# Patient Record
Sex: FEMALE | Race: WHITE | NOT HISPANIC OR LATINO | Employment: FULL TIME | URBAN - METROPOLITAN AREA
[De-identification: names, ages, dates, MRNs, and addresses within clinical notes are randomized per-mention and may not be internally consistent; named-entity substitution may affect disease eponyms.]

---

## 2017-01-27 ENCOUNTER — OFFICE VISIT (OUTPATIENT)
Dept: FAMILY MEDICINE | Facility: CLINIC | Age: 37
End: 2017-01-27
Payer: COMMERCIAL

## 2017-01-27 VITALS
BODY MASS INDEX: 19.3 KG/M2 | WEIGHT: 123 LBS | HEART RATE: 70 BPM | TEMPERATURE: 97.7 F | DIASTOLIC BLOOD PRESSURE: 78 MMHG | OXYGEN SATURATION: 99 % | HEIGHT: 67 IN | RESPIRATION RATE: 14 BRPM | SYSTOLIC BLOOD PRESSURE: 116 MMHG

## 2017-01-27 DIAGNOSIS — Z00.00 ENCOUNTER FOR ROUTINE ADULT HEALTH EXAMINATION WITHOUT ABNORMAL FINDINGS: Primary | ICD-10-CM

## 2017-01-27 LAB
CHOLEST SERPL-MCNC: 153 MG/DL
GLUCOSE SERPL-MCNC: 81 MG/DL (ref 70–99)
HDLC SERPL-MCNC: 75 MG/DL
HIV 1+2 AB+HIV1 P24 AG SERPL QL IA: NORMAL
LDLC SERPL CALC-MCNC: 68 MG/DL
NONHDLC SERPL-MCNC: 78 MG/DL
T PALLIDUM IGG+IGM SER QL: NEGATIVE
TRIGL SERPL-MCNC: 50 MG/DL
TSH SERPL DL<=0.005 MIU/L-ACNC: 1.94 MU/L (ref 0.4–4)

## 2017-01-27 PROCEDURE — 99395 PREV VISIT EST AGE 18-39: CPT | Performed by: FAMILY MEDICINE

## 2017-01-27 PROCEDURE — 87491 CHLMYD TRACH DNA AMP PROBE: CPT | Performed by: FAMILY MEDICINE

## 2017-01-27 PROCEDURE — 86780 TREPONEMA PALLIDUM: CPT | Performed by: FAMILY MEDICINE

## 2017-01-27 PROCEDURE — 87389 HIV-1 AG W/HIV-1&-2 AB AG IA: CPT | Performed by: FAMILY MEDICINE

## 2017-01-27 PROCEDURE — 87591 N.GONORRHOEAE DNA AMP PROB: CPT | Performed by: FAMILY MEDICINE

## 2017-01-27 PROCEDURE — 82947 ASSAY GLUCOSE BLOOD QUANT: CPT | Performed by: FAMILY MEDICINE

## 2017-01-27 PROCEDURE — 36415 COLL VENOUS BLD VENIPUNCTURE: CPT | Performed by: FAMILY MEDICINE

## 2017-01-27 PROCEDURE — 84443 ASSAY THYROID STIM HORMONE: CPT | Performed by: FAMILY MEDICINE

## 2017-01-27 PROCEDURE — 80061 LIPID PANEL: CPT | Performed by: FAMILY MEDICINE

## 2017-01-27 NOTE — PROGRESS NOTES
SUBJECTIVE:     CC: Tracy Dempsey is an 36 year old woman who presents for preventive health visit.     Healthy Habits:    Do you get at least three servings of calcium containing foods daily (dairy, green leafy vegetables, etc.)? yes    Amount of exercise or daily activities, outside of work: 1 day(s) per week-very active job    Problems taking medications regularly No    Medication side effects: No    Have you had an eye exam in the past two years? yes    Do you see a dentist twice per year? No-Appt in March    Do you have sleep apnea, excessive snoring or daytime drowsiness?no    No concerns today. Has been generally healthy, recently had vomiting and diarrhea after visiting with her sick nieces/nephes. Stopped taking birth control-forgot her nuva ring on vacation and decided to stop as she was experiencing hair loss. Occasionally uses kenalog cream for dry skin irritated skin near her nose. No changes in family history. Has a maternal grandmother who had breast cancer. Doesn't smoke. Drinks 2 glasses of wine a night on average.     -------------------------------------    Today's PHQ-2 Score:   PHQ-2 ( 1999 Pfizer) 5/22/2015 11/15/2013   Q1: Little interest or pleasure in doing things 0 0   Q2: Feeling down, depressed or hopeless 0 0   PHQ-2 Score 0 0     Abuse: Current or Past(Physical, Sexual or Emotional)- No  Do you feel safe in your environment - Yes    Social History   Substance Use Topics     Smoking status: Never Smoker      Smokeless tobacco: Never Used     Alcohol Use: Yes      Comment: socially     The patient does not drink >3 drinks per day nor >7 drinks per week.    Recent Labs   Lab Test  05/22/15   0859  06/07/11   0856   CHOL  171  168   HDL  80  60   LDL  77  94   TRIG  68  71   CHOLHDLRATIO  2.1  2.8       Reviewed orders with patient.  Reviewed health maintenance and updated orders accordingly - Yes    Mammo Decision Support:  Mammogram not appropriate for this patient based on  age.    Pertinent mammograms are reviewed under the imaging tab.  History of abnormal Pap smear: NO - age 30- 65 PAP every 3 years recommended  All Histories reviewed and updated in Epic.  No past medical history on file.   No past surgical history on file.  Obstetric History     No data available          ROS:  C: NEGATIVE for fever, chills, change in weight  I: NEGATIVE for worrisome rashes, moles or lesions  E: NEGATIVE for vision changes or irritation  ENT: NEGATIVE for ear, mouth and throat problems  R: NEGATIVE for significant cough or SOB  B: NEGATIVE for masses, tenderness or discharge  CV: NEGATIVE for chest pain, palpitations or peripheral edema  GI: NEGATIVE for nausea, abdominal pain, heartburn, or change in bowel habits  : NEGATIVE for unusual urinary or vaginal symptoms. Periods are regular.  M: NEGATIVE for significant arthralgias or myalgia  N: NEGATIVE for weakness, dizziness or paresthesias  P: NEGATIVE for changes in mood or affect    Problem list, Medication list, Allergies, and Medical/Social/Surgical histories reviewed in [facility] and updated as appropriate.  Labs reviewed in EPIC  OBJECTIVE:     There were no vitals taken for this visit.  EXAM:  GENERAL: healthy, alert and no distress  EYES: Eyes grossly normal to inspection, PERRL and conjunctivae and sclerae normal  HENT: ear canals and TM's normal, nose and mouth without ulcers or lesions  NECK: no adenopathy, no asymmetry, masses, or scars and thyroid normal to palpation  RESP: lungs clear to auscultation - no rales, rhonchi or wheezes  BREAST: normal without masses, tenderness or nipple discharge and no palpable axillary masses or adenopathy  CV: regular rate and rhythm, normal S1 S2, no S3 or S4, no murmur, click or rub, no peripheral edema and peripheral pulses strong  ABDOMEN: soft, nontender, no hepatosplenomegaly, no masses and bowel sounds normal  MS: no gross musculoskeletal defects noted, no edema  SKIN: no suspicious lesions or  "rashes  NEURO: Normal strength and tone, mentation intact and speech normal  PSYCH: mentation appears normal, affect normal/bright    ASSESSMENT/PLAN:     Tracy was seen today for physical.    Diagnoses and all orders for this visit:    Encounter for routine adult health examination without abnormal findings  -     HIV Antigen Antibody Combo  -     Anti Treponema  -     NEISSERIA GONORRHOEA PCR  -     CHLAMYDIA TRACHOMATIS PCR  -     Lipid Profile (Chol, Trig, HDL, LDL calc)  -     Glucose  -     TSH with free T4 reflex    Tracy would like STD screening as she hasn't has screening in two years. No symptoms of STD's.   - HIV antigen antibody combo   - anti treponema   - chlamydia trachomatis PCR  - neisseria gonorrhea PCR    Has not had lipid levels, TSH, or glucose levels checked in sometime. Has a sister with hashimoto's. No family history of hyperlipidemia or diabetes. Routine check   - lipid profile  - glucose  - TSH with free T4 reflex    COUNSELING:   Reviewed preventive health counseling, as reflected in patient instructions       Regular exercise       Healthy diet/nutrition       Safe sex practices/STD prevention         reports that she has never smoked. She has never used smokeless tobacco.    Estimated body mass index is 19.10 kg/(m^2) as calculated from the following:    Height as of 5/22/15: 5' 7\" (1.702 m).    Weight as of 5/22/15: 122 lb (55.339 kg).       Counseling Resources:  ATP IV Guidelines  Pooled Cohorts Equation Calculator  Breast Cancer Risk Calculator  FRAX Risk Assessment  ICSI Preventive Guidelines  Dietary Guidelines for Americans, 2010  USDA's MyPlate  ASA Prophylaxis  Lung CA Screening      This note is scribed by Kiki Oneil MS3 on behalf of Dr. Marina.     Jacque Marina MD  [facility]  HPI      ROS      Physical Exam      "

## 2017-01-27 NOTE — NURSING NOTE
"Chief Complaint   Patient presents with     Physical     pap not due til 2018-pt is fasting       Initial /78 mmHg  Pulse 70  Temp(Src) 97.7  F (36.5  C) (Oral)  Resp 14  Ht 5' 6.75\" (1.695 m)  Wt 123 lb (55.792 kg)  BMI 19.42 kg/m2  SpO2 99%  LMP 01/09/2017  Breastfeeding? No Estimated body mass index is 19.42 kg/(m^2) as calculated from the following:    Height as of this encounter: 5' 6.75\" (1.695 m).    Weight as of this encounter: 123 lb (55.792 kg).  BP completed using cuff size: regular    Health Maintenance that is potentially due pending provider review:  Health Maintenance Due   Topic Date Due     INFLUENZA VACCINE (SYSTEM ASSIGNED)  09/01/2016         Fear of needles-can get this done at work per pt    "

## 2017-01-29 LAB
C TRACH DNA SPEC QL NAA+PROBE: NORMAL
N GONORRHOEA DNA SPEC QL NAA+PROBE: NORMAL
SPECIMEN SOURCE: NORMAL
SPECIMEN SOURCE: NORMAL

## 2017-04-12 ENCOUNTER — TELEPHONE (OUTPATIENT)
Dept: FAMILY MEDICINE | Facility: CLINIC | Age: 37
End: 2017-04-12

## 2017-04-12 NOTE — TELEPHONE ENCOUNTER
LS,  Pt was told yesterday by previous partner that he was diagnosed with HSV  Pt not sure what type he has; oral or genital  Pt has never had symptoms  Wondering if she should be tested.  Please advise.  Tammy ARROYO RN

## 2017-04-12 NOTE — TELEPHONE ENCOUNTER
i would not recommend testing unless she has symptoms, pain , vaginal discomfort, outbreaks etc   We do not treat unless there is an outbreak, can you let her know that ?  Thanks

## 2017-04-12 NOTE — TELEPHONE ENCOUNTER
Reason for Call:  call back    Detailed comments: patient would like someone to call her back to discuss STD results.    Phone Number Patient can be reached at: Home number on file [phone] (home)    Best Time: any time    Can we leave a detailed message on this number? YES    Call taken on 4/12/2017 at 1:07 PM by Shalini Butler  .

## 2017-04-13 ENCOUNTER — TELEPHONE (OUTPATIENT)
Dept: FAMILY MEDICINE | Facility: CLINIC | Age: 37
End: 2017-04-13

## 2017-04-13 DIAGNOSIS — Z11.3 SCREEN FOR STD (SEXUALLY TRANSMITTED DISEASE): Primary | ICD-10-CM

## 2017-04-13 NOTE — TELEPHONE ENCOUNTER
Reason for Call:  More Questions on Test Results     Detailed comments: Tracy Dempsey called and spoke with a nurse about her STD results yesterday. She was told that she would not need further testing and she would like to speak with a nurse about clarifications and some other questions that have come up.       Phone Number Patient can be reached at: Home number on file [phone] (home)    Best Time: ASAP     Can we leave a detailed message on this number? YES    Call taken on 4/13/2017 at 11:10 AM by Gerda Mccollum

## 2017-04-13 NOTE — TELEPHONE ENCOUNTER
LS,  See TE from yesterday.  Pt has further herpes concerns.    Patient would like herpes testing anyway for piece of mind.  Is worried she can transmit this to someone else.  She has never had herpes symptoms though.  Educated pt on transmission of herpes, using protection, symptoms to watch for, etc.    States she has another condition where she on occasion has dry patch of skin to genital area and some discomfort at times also, but this has been present since before she started having sexual activity.  Please advise on lab orders for herpes  Tammy ARROYO RN

## 2017-04-19 DIAGNOSIS — Z11.3 SCREEN FOR STD (SEXUALLY TRANSMITTED DISEASE): ICD-10-CM

## 2017-04-28 ENCOUNTER — OFFICE VISIT (OUTPATIENT)
Dept: FAMILY MEDICINE | Facility: CLINIC | Age: 37
End: 2017-04-28
Payer: COMMERCIAL

## 2017-04-28 VITALS
TEMPERATURE: 96 F | RESPIRATION RATE: 14 BRPM | BODY MASS INDEX: 19.46 KG/M2 | DIASTOLIC BLOOD PRESSURE: 78 MMHG | HEIGHT: 67 IN | HEART RATE: 76 BPM | SYSTOLIC BLOOD PRESSURE: 112 MMHG | WEIGHT: 124 LBS | OXYGEN SATURATION: 99 %

## 2017-04-28 DIAGNOSIS — Z11.3 SCREEN FOR STD (SEXUALLY TRANSMITTED DISEASE): Primary | ICD-10-CM

## 2017-04-28 PROCEDURE — 86696 HERPES SIMPLEX TYPE 2 TEST: CPT | Performed by: FAMILY MEDICINE

## 2017-04-28 PROCEDURE — 86695 HERPES SIMPLEX TYPE 1 TEST: CPT | Performed by: FAMILY MEDICINE

## 2017-04-28 PROCEDURE — 36415 COLL VENOUS BLD VENIPUNCTURE: CPT | Performed by: FAMILY MEDICINE

## 2017-04-28 PROCEDURE — 99213 OFFICE O/P EST LOW 20 MIN: CPT | Performed by: FAMILY MEDICINE

## 2017-04-28 NOTE — MR AVS SNAPSHOT
"              After Visit Summary   4/28/2017    Tracy Dempsey    MRN: [id number]           Patient Information     Date Of Birth          1980        Visit Information        Provider Department      4/28/2017 2:00 PM Jacque Marina MD [facility]        Today's Diagnoses     Screen for STD (sexually transmitted disease)    -  1       Follow-ups after your visit        Who to contact     If you have questions or need follow up information about today's clinic visit or your schedule please contact [facility] directly at [phone].  Normal or non-critical lab and imaging results will be communicated to you by "[employer]hart, letter or phone within 4 business days after the clinic has received the results. If you do not hear from us within 7 days, please contact the clinic through "[employer]hart or phone. If you have a critical or abnormal lab result, we will notify you by phone as soon as possible.  Submit refill requests through [employer] or call your pharmacy and they will forward the refill request to us. Please allow 3 business days for your refill to be completed.          Additional Information About Your Visit        MyChart Information     [employer] gives you secure access to your electronic health record. If you see a primary care provider, you can also send messages to your care team and make appointments. If you have questions, please call your primary care clinic.  If you do not have a primary care provider, please call [phone] and they will assist you.        Care EveryWhere ID     This is your Care EveryWhere ID. This could be used by other organizations to access your [facility] medical records  [id number]        Your Vitals Were     Pulse Temperature Respirations Height Pulse Oximetry Breastfeeding?    76 96  F (35.6  C) (Oral) 14 5' 6.75\" (1.695 m) 99% No    BMI (Body Mass Index)                   19.57 kg/m2            Blood Pressure from Last 3 Encounters:   04/28/17 112/78 "   01/27/17 116/78   05/22/15 109/64    Weight from Last 3 Encounters:   04/28/17 124 lb (56.2 kg)   01/27/17 123 lb (55.8 kg)   05/22/15 122 lb (55.3 kg)              We Performed the Following     Herpes Simplex Virus 1 and 2 IgG        Primary Care Provider Office Phone # Fax #    Jacque Marina [phone]       [facility] [address] [address]        Thank you!     Thank you for choosing [facility]  for your care. Our goal is always to provide you with excellent care. Hearing back from our patients is one way we can continue to improve our services. Please take a few minutes to complete the written survey that you may receive in the mail after your visit with us. Thank you!             Your Updated Medication List - Protect others around you: Learn how to safely use, store and throw away your medicines at www.disposemymeds.org.          This list is accurate as of: 4/28/17  3:08 PM.  Always use your most recent med list.                   Brand Name Dispense Instructions for use    triamcinolone 0.1 % cream    KENALOG    60 g    Apply 60 g topically 2 times daily.

## 2017-04-28 NOTE — LETTER
[facility]  [address], [address]  [place], [place] [address] [phone]     May 2, 2017     Tracy Dempsey                                                                                                                               [address] [facility] [address]        Dear Tracy Dempsey:      The results of your recent labs were NORMAL.  If you have any further questions or problems, please contact our office.    Sincerely,      Jacque Marina MD    Clinic hours:  Monday 7:30 AM - 5:00 PM    Tuesday  7:00 AM - 7:00 PM    Wednesday  7:00 AM - 5:00 PM    Thursday  7:30 AM -  7:00 PM    Friday   7:30 AM -  5:00 PM

## 2017-04-28 NOTE — PROGRESS NOTES
SUBJECTIVE:                                                    Tracy Dempsey is a 36 year old female who presents to clinic today for the following health issues:    Chief Complaint   Patient presents with     RECHECK     follow up labs             Problem list and histories reviewed & adjusted, as indicated.  Additional history: as documented    Patient Active Problem List   Diagnosis     CARDIOVASCULAR SCREENING; LDL GOAL LESS THAN 160     Cervicalgia     History reviewed. No pertinent surgical history.    Social History   Substance Use Topics     Smoking status: Never Smoker     Smokeless tobacco: Never Used     Alcohol use 0.0 oz/week     0 Standard drinks or equivalent per week      Comment: 2 drinks daily     Family History   Problem Relation Age of Onset     Hypertension Father      Hypertension Paternal Grandfather      CANCER Maternal Grandmother      breast     CEREBROVASCULAR DISEASE Paternal Grandfather      Alcohol/Drug Father      Neurologic Disorder Paternal Grandmother      als     Neurologic Disorder Sister      MS may be     Thyroid Disease Sister          Current Outpatient Prescriptions   Medication Sig Dispense Refill     triamcinolone (KENALOG) 0.1 % cream Apply 60 g topically 2 times daily. 60 g 0     Allergies   Allergen Reactions     Nickel      Recent Labs   Lab Test  01/27/17   0916  05/22/15   0859  06/07/11   0856   LDL  68  77  94   HDL  75  80  60   TRIG  50  68  71   TSH  1.94   --   1.91      BP Readings from Last 3 Encounters:   04/28/17 112/78   01/27/17 116/78   05/22/15 109/64    Wt Readings from Last 3 Encounters:   04/28/17 124 lb (56.2 kg)   01/27/17 123 lb (55.8 kg)   05/22/15 122 lb (55.3 kg)                  Labs reviewed in EPIC    Reviewed and updated as needed this visit by clinical staff  Tobacco  Allergies  Meds  Med Hx  Surg Hx  Fam Hx  Soc Hx      Reviewed and updated as needed this visit by Provider         ROS:  Constitutional, HEENT, cardiovascular,  "pulmonary, gi and gu systems are negative, except as otherwise noted.    OBJECTIVE:                                                    /78  Pulse 76  Temp 96  F (35.6  C) (Oral)  Resp 14  Ht 5' 6.75\" (1.695 m)  Wt 124 lb (56.2 kg)  SpO2 99%  Breastfeeding? No  BMI 19.57 kg/m2  Body mass index is 19.57 kg/(m^2).  GENERAL: healthy, alert and no distress  MS: no gross musculoskeletal defects noted, no edema    Diagnostic Test Results:  none      ASSESSMENT/PLAN:                                                        1. Screen for STD (sexually transmitted disease)  She just wants to make sure she does not have this , her most recent partner was diagnosed with herpes, they have not been together for three months and she does not have any symptoms currently , she has had occasional vag itching but no sores , no pain etc   - Herpes Simplex Virus 1 and 2 IgG    Discussed doing the screening , which will check for both type 1 and type 2 herpes simplex, Pt is aware  and comfortable with the current plan.      Jacque Marina MD  [facility]    "

## 2017-04-28 NOTE — NURSING NOTE
"Chief Complaint   Patient presents with     RECHECK     follow up labs       Initial /78  Pulse 76  Temp 96  F (35.6  C) (Oral)  Resp 14  Ht 5' 6.75\" (1.695 m)  Wt 124 lb (56.2 kg)  SpO2 99%  Breastfeeding? No  BMI 19.57 kg/m2 Estimated body mass index is 19.57 kg/(m^2) as calculated from the following:    Height as of this encounter: 5' 6.75\" (1.695 m).    Weight as of this encounter: 124 lb (56.2 kg).  BP completed using cuff size: regular    Health Maintenance that is potentially due pending provider review:  There are no preventive care reminders to display for this patient.      n/a  "

## 2017-05-01 LAB
HSV1 IGG SERPL QL IA: NORMAL AI (ref 0–0.8)
HSV2 IGG SERPL QL IA: NORMAL AI (ref 0–0.8)

## 2018-01-26 ENCOUNTER — OFFICE VISIT (OUTPATIENT)
Dept: FAMILY MEDICINE | Facility: CLINIC | Age: 38
End: 2018-01-26
Payer: COMMERCIAL

## 2018-01-26 VITALS
WEIGHT: 127 LBS | SYSTOLIC BLOOD PRESSURE: 118 MMHG | TEMPERATURE: 97.9 F | HEART RATE: 78 BPM | RESPIRATION RATE: 14 BRPM | BODY MASS INDEX: 19.93 KG/M2 | HEIGHT: 67 IN | DIASTOLIC BLOOD PRESSURE: 76 MMHG | OXYGEN SATURATION: 99 %

## 2018-01-26 DIAGNOSIS — N76.6 ULCER OF GENITAL LABIA: Primary | ICD-10-CM

## 2018-01-26 PROCEDURE — 99000 SPECIMEN HANDLING OFFICE-LAB: CPT | Performed by: FAMILY MEDICINE

## 2018-01-26 PROCEDURE — 87252 VIRUS INOCULATION TISSUE: CPT | Mod: 90 | Performed by: FAMILY MEDICINE

## 2018-01-26 PROCEDURE — 99213 OFFICE O/P EST LOW 20 MIN: CPT | Performed by: FAMILY MEDICINE

## 2018-01-26 NOTE — PATIENT INSTRUCTIONS
Herpes  If you have herpes, you re not alone. Millions of Americans have it. Herpes has no cure. But you can control it and learn how to protect yourself and others from outbreaks.  What is herpes?  Herpes is a chronic (lifelong) virus. It can cause sores and discomfort. You get it from contact with someone who carries the virus. If sores occur on the lips, you have oral herpes. If sores occur on the penis or around the vagina, you have genital herpes.  Herpes outbreaks    The first outbreak of herpes sores is usually the most severe. Then, the soldiers of the body s immune system, white blood cells, produce antibodies. These antibodies help neutralize the herpes virus and may help make future attacks less severe.    Some people have only one outbreak of sores. Some people have periods of frequent outbreaks (every few weeks). Outbreaks of herpes sores usually happen less often over time.    Herpes sores may appear without a cause. Outbreaks are more likely when the immune system is weak. Other viral infections (such as a cold) can cause outbreaks. Stress from a poor diet, fatigue, or emotional upset can lead to outbreaks of sores. Exposure to strong sunlight often causes herpes sores to reappear.   To help prevent outbreaks    To prevent oral herpes outbreaks, avoid overexposure to wind, sun, and extreme temperatures. Use sunscreen and lip balm on affected areas.    If you are having frequent outbreaks, ask your healthcare provider about medicines that can help prevent outbreaks.  How herpes spreads to others  Herpes can be spread during an outbreak. But even without sores present, you can still  shed  the virus and infect others. You can take steps to prevent this.  To protect yourself and others    If you have an oral sore, avoid kissing and oral-genital contact.    If you have a genital sore, avoid intercourse. Also avoid oral-genital contact.    Wash your hands after touching a sore.    Use a condom each time  you have sex. You can pass the virus even when sores aren t present. If you re unsure about the timing of certain kinds of physical contact, ask your health care provider.    Tell any new partners that you have herpes.    If you re a woman, have Pap tests as often as your healthcare provider recommends.    A woman can spread herpes to their  during the birth process, whether or not they have an active genital sore. If pregnant, don't forget to tell your healthcare provider early in the pregnancy.     In some cases, daily antiviral medicine (acyclovir, famcyclovir, or valavyclovir), in addition to consistent condom use, may reduce your chances of spreading herpes to an uninfected partner. Ask your healthcare provider if this medicine would be helpful for you.  Resources  American Social Health Association STD Hotline  127.249.5722  www.ashastd.org  Centers for Disease Control and Prevention  947.495.3609  www.cdc.gov/std   Date Last Reviewed: 2016-2017 [employer] [address] [place] [address], [place] [address]. All rights reserved. This information is not intended as a substitute for professional medical care. Always follow your healthcare professional's instructions.

## 2018-01-26 NOTE — PROGRESS NOTES
SUBJECTIVE:   Tracy Dempsey is a 37 year old female who presents to clinic today for the following health issues:      Chief Complaint   Patient presents with     Vaginal Problem     possible herpes - lesion present-wants culture-herpes test done for reported exposure 4--results negative-2nd time for same type symptoms- sx present for 1 week, last outbreak lasted 2 weeks, painful and itchy     Patient presents to clinic for evaluation of foot ulcer on the labia majora that has been present for the past 1 week.   Patient was seen in the clinic previously approximately 1 year ago for testing for herpes.  Test at that time were negative.  She is concerned that the current ulcer is consistent with a herpes lesion.  Ulcer is painful.  Has not used any over-the-counter medications for it.     Problem list and histories reviewed & adjusted, as indicated.  Additional history: as documented    Patient Active Problem List   Diagnosis     CARDIOVASCULAR SCREENING; LDL GOAL LESS THAN 160     Cervicalgia     History reviewed. No pertinent surgical history.    Social History   Substance Use Topics     Smoking status: Never Smoker     Smokeless tobacco: Never Used     Alcohol use 0.0 oz/week     0 Standard drinks or equivalent per week      Comment: 2 drinks daily     Family History   Problem Relation Age of Onset     Hypertension Father      Hypertension Paternal Grandfather      CANCER Maternal Grandmother      breast     CEREBROVASCULAR DISEASE Paternal Grandfather      Alcohol/Drug Father      Neurologic Disorder Paternal Grandmother      als     Neurologic Disorder Sister      MS may be     Thyroid Disease Sister            Reviewed and updated as needed this visit by clinical staff  Tobacco  Allergies  Meds  Med Hx  Surg Hx  Fam Hx  Soc Hx      Reviewed and updated as needed this visit by Provider         ROS:  Constitutional, HEENT, cardiovascular, pulmonary, gi and gu systems are negative, except as otherwise  "noted.    OBJECTIVE:     /76  Pulse 78  Temp 97.9  F (36.6  C) (Tympanic)  Resp 14  Ht 5' 6.75\" (1.695 m)  Wt 127 lb (57.6 kg)  SpO2 99%  Breastfeeding? No  BMI 20.04 kg/m2  Body mass index is 20.04 kg/(m^2).  GENERAL: healthy, alert and no distress  RESP: lungs clear to auscultation - no rales, rhonchi or wheezes  CV: regular rate and rhythm, normal S1 S2, no S3 or S4, no murmur, click or rub, no peripheral edema and peripheral pulses strong   (female): normal female external genitalia, normal urethral meatus, vaginal mucosa, normal cervix/adnexa/uterus without masses or discharge.  Superficial clusters of ulcer on the left labia minora.  Patient  Diagnostic Test Results:  Results for orders placed or performed in visit on 01/26/18   Viral Culture Non-respiratory   Result Value Ref Range    Viral Culture Source Labia     Viral Culture Non-respiratory Preliminary SEE NOTE     Viral Culture Non-respiratory Final SEE NOTE (A)        ASSESSMENT/PLAN:   1. Ulcer of genital labia  Assessment: Appearance of the ulcer is consistent with herpes simplex infection.  Sample also sent to the lab for analysis.  Patient was advised to avoid sexual intercourse for coming into contact with liquids from the ulcer and the rest of her body.  Plan:  - Viral Culture Non-respiratory    Toan Shanks MD  [facility]  PAGER: [phone]      "

## 2018-01-26 NOTE — NURSING NOTE
"Chief Complaint   Patient presents with     Vaginal Problem     possible herpes - lesion present-wants culture-herpes test done for reported exposure 4--results negative-2nd time for same type symptoms- sx present for 1 week, last outbreak lasted 2 weeks, painful and itchy       Initial /76  Pulse 78  Temp 97.9  F (36.6  C) (Tympanic)  Resp 14  Ht 5' 6.75\" (1.695 m)  Wt 127 lb (57.6 kg)  SpO2 99%  Breastfeeding? No  BMI 20.04 kg/m2 Estimated body mass index is 20.04 kg/(m^2) as calculated from the following:    Height as of this encounter: 5' 6.75\" (1.695 m).    Weight as of this encounter: 127 lb (57.6 kg).  BP completed using cuff size: regular    Health Maintenance that is potentially due pending provider review:  Health Maintenance Due   Topic Date Due     INFLUENZA VACCINE (SYSTEM ASSIGNED)  09/01/2017     PAP Q3 YR  05/22/2018         Pt advised to make px  Flu vaccine declined  "

## 2018-01-26 NOTE — MR AVS SNAPSHOT
After Visit Summary   1/26/2018    Tracy Dempsey    MRN: [id number]           Patient Information     Date Of Birth          1980        Visit Information        Provider Department      1/26/2018 10:30 AM Toan Shanks MD [facility]        Today's Diagnoses     Ulcer of genital labia    -  1      Care Instructions      Herpes  If you have herpes, you re not alone. Millions of Americans have it. Herpes has no cure. But you can control it and learn how to protect yourself and others from outbreaks.  What is herpes?  Herpes is a chronic (lifelong) virus. It can cause sores and discomfort. You get it from contact with someone who carries the virus. If sores occur on the lips, you have oral herpes. If sores occur on the penis or around the vagina, you have genital herpes.  Herpes outbreaks    The first outbreak of herpes sores is usually the most severe. Then, the soldiers of the body s immune system, white blood cells, produce antibodies. These antibodies help neutralize the herpes virus and may help make future attacks less severe.    Some people have only one outbreak of sores. Some people have periods of frequent outbreaks (every few weeks). Outbreaks of herpes sores usually happen less often over time.    Herpes sores may appear without a cause. Outbreaks are more likely when the immune system is weak. Other viral infections (such as a cold) can cause outbreaks. Stress from a poor diet, fatigue, or emotional upset can lead to outbreaks of sores. Exposure to strong sunlight often causes herpes sores to reappear.   To help prevent outbreaks    To prevent oral herpes outbreaks, avoid overexposure to wind, sun, and extreme temperatures. Use sunscreen and lip balm on affected areas.    If you are having frequent outbreaks, ask your healthcare provider about medicines that can help prevent outbreaks.  How herpes spreads to others  Herpes can be spread during an outbreak. But even  without sores present, you can still  shed  the virus and infect others. You can take steps to prevent this.  To protect yourself and others    If you have an oral sore, avoid kissing and oral-genital contact.    If you have a genital sore, avoid intercourse. Also avoid oral-genital contact.    Wash your hands after touching a sore.    Use a condom each time you have sex. You can pass the virus even when sores aren t present. If you re unsure about the timing of certain kinds of physical contact, ask your health care provider.    Tell any new partners that you have herpes.    If you re a woman, have Pap tests as often as your healthcare provider recommends.    A woman can spread herpes to their  during the birth process, whether or not they have an active genital sore. If pregnant, don't forget to tell your healthcare provider early in the pregnancy.     In some cases, daily antiviral medicine (acyclovir, famcyclovir, or valavyclovir), in addition to consistent condom use, may reduce your chances of spreading herpes to an uninfected partner. Ask your healthcare provider if this medicine would be helpful for you.  Resources  American [employer] Association STD Hotline  692.931.6877  www.ashastd.org  Centers for Disease Control and Prevention  158.907.1635  www.cdc.gov/std   Date Last Reviewed: 2016-2017 [employer]. [address] [place] [address]. All rights reserved. This information is not intended as a substitute for professional medical care. Always follow your healthcare professional's instructions.                Follow-ups after your visit        Follow-up notes from your care team     Return if symptoms worsen or fail to improve.      Who to contact     If you have questions or need follow up information about today's clinic visit or your schedule please contact [facility] directly at [phone].  Normal or non-critical lab and imaging results will  "be communicated to you by MyChart, letter or phone within 4 business days after the clinic has received the results. If you do not hear from us within 7 days, please contact the clinic through [email or web address] or phone. If you have a critical or abnormal lab result, we will notify you by phone as soon as possible.  Submit refill requests through [email or web address] or call your pharmacy and they will forward the refill request to us. Please allow 3 business days for your refill to be completed.          Additional Information About Your Visit        [email or web address] Information     [email or web address] gives you secure access to your electronic health record. If you see a primary care provider, you can also send messages to your care team and make appointments. If you have questions, please call your primary care clinic.  If you do not have a primary care provider, please call [phone] and they will assist you.        Care EveryWhere ID     This is your Care EveryWhere ID. This could be used by other organizations to access your [place] medical records  [id number]        Your Vitals Were     Pulse Temperature Respirations Height Pulse Oximetry Breastfeeding?    78 97.9  F (36.6  C) (Tympanic) 14 5' 6.75\" (1.695 m) 99% No    BMI (Body Mass Index)                   20.04 kg/m2            Blood Pressure from Last 3 Encounters:   01/26/18 118/76   04/28/17 112/78   01/27/17 116/78    Weight from Last 3 Encounters:   01/26/18 127 lb (57.6 kg)   04/28/17 124 lb (56.2 kg)   01/27/17 123 lb (55.8 kg)              Today, you had the following     No orders found for display       Primary Care Provider Office Phone # Fax #    Jacque Marina [phone] [address] [facility] [address] [place] [address]        Equal Access to Services     JUAREZ CHRISTIANSON : Cassidy Bowling, nancy mark, jacinto gardner. So [facility] [phone].    ATENCIÓN: Si habla español, tiene a cedeno disposición " servicios gratuitos de asistencia lingüística. Fabian lopez [phone].    We comply with applicable federal civil rights laws and [place] laws. We do not discriminate on the basis of race, color, national origin, age, disability, sex, sexual orientation, or gender identity.            Thank you!     Thank you for choosing [facility]  for your care. Our goal is always to provide you with excellent care. Hearing back from our patients is one way we can continue to improve our services. Please take a few minutes to complete the written survey that you may receive in the mail after your visit with us. Thank you!             Your Updated Medication List - Protect others around you: Learn how to safely use, store and throw away your medicines at www.disposemymeds.org.          This list is accurate as of 1/26/18 10:55 AM.  Always use your most recent med list.                   Brand Name Dispense Instructions for use Diagnosis    triamcinolone 0.1 % cream    KENALOG    60 g    Apply 60 g topically 2 times daily.    Rash

## 2018-01-28 LAB
SPECIMEN SOURCE: ABNORMAL
VIRUS SPEC CULT: ABNORMAL
VIRUS SPEC CULT: ABNORMAL

## 2018-01-29 DIAGNOSIS — B00.9 HSV (HERPES SIMPLEX VIRUS) INFECTION: Primary | ICD-10-CM

## 2018-01-29 RX ORDER — ACYCLOVIR 400 MG/1
400 TABLET ORAL 3 TIMES DAILY
Qty: 30 TABLET | Refills: 0 | Status: SHIPPED | OUTPATIENT
Start: 2018-01-29 | End: 2018-08-15

## 2018-01-29 RX ORDER — ACYCLOVIR 200 MG/1
200 CAPSULE ORAL
Qty: 25 CAPSULE | Refills: 5 | Status: SHIPPED | OUTPATIENT
Start: 2018-02-12 | End: 2019-08-07

## 2018-01-30 ENCOUNTER — OFFICE VISIT (OUTPATIENT)
Dept: FAMILY MEDICINE | Facility: CLINIC | Age: 38
End: 2018-01-30
Payer: COMMERCIAL

## 2018-01-30 VITALS
BODY MASS INDEX: 19.62 KG/M2 | WEIGHT: 125 LBS | DIASTOLIC BLOOD PRESSURE: 72 MMHG | SYSTOLIC BLOOD PRESSURE: 111 MMHG | TEMPERATURE: 97.7 F | OXYGEN SATURATION: 99 % | HEART RATE: 68 BPM | HEIGHT: 67 IN | RESPIRATION RATE: 16 BRPM

## 2018-01-30 DIAGNOSIS — Z00.00 ENCOUNTER FOR ROUTINE ADULT HEALTH EXAMINATION WITHOUT ABNORMAL FINDINGS: Primary | ICD-10-CM

## 2018-01-30 DIAGNOSIS — B00.9 HSV (HERPES SIMPLEX VIRUS) INFECTION: ICD-10-CM

## 2018-01-30 PROCEDURE — 87624 HPV HI-RISK TYP POOLED RSLT: CPT | Performed by: FAMILY MEDICINE

## 2018-01-30 PROCEDURE — 99395 PREV VISIT EST AGE 18-39: CPT | Performed by: FAMILY MEDICINE

## 2018-01-30 PROCEDURE — G0145 SCR C/V CYTO,THINLAYER,RESCR: HCPCS | Performed by: FAMILY MEDICINE

## 2018-01-30 NOTE — MR AVS SNAPSHOT
After Visit Summary   1/30/2018    Tracy Dempsey    MRN: [id number]           Patient Information     Date Of Birth          1980        Visit Information        Provider Department      1/30/2018 9:00 AM Jacque Marina MD [facility]        Today's Diagnoses     Encounter for routine adult health examination without abnormal findings    -  1    HSV (herpes simplex virus) infection          Care Instructions      Preventive Health Recommendations  Female Ages 26 - 39  Yearly exam:   See your health care provider every year in order to    Review health changes.     Discuss preventive care.      Review your medicines if you your doctor has prescribed any.    Until age 30: Get a Pap test every three years (more often if you have had an abnormal result).    After age 30: Talk to your doctor about whether you should have a Pap test every 3 years or have a Pap test with HPV screening every 5 years.   You do not need a Pap test if your uterus was removed (hysterectomy) and you have not had cancer.  You should be tested each year for STDs (sexually transmitted diseases), if you're at risk.   Talk to your provider about how often to have your cholesterol checked.  If you are at risk for diabetes, you should have a diabetes test (fasting glucose).  Shots: Get a flu shot each year. Get a tetanus shot every 10 years.   Nutrition:     Eat at least 5 servings of fruits and vegetables each day.    Eat whole-grain bread, whole-wheat pasta and brown rice instead of white grains and rice.    Talk to your provider about Calcium and Vitamin D.     Lifestyle    Exercise at least 150 minutes a week (30 minutes a day, 5 days of the week). This will help you control your weight and prevent disease.    Limit alcohol to one drink per day.    No smoking.     Wear sunscreen to prevent skin cancer.    See your dentist every six months for an exam and cleaning.            Follow-ups after your visit        Who to  "contact     If you have questions or need follow up information about today's clinic visit or your schedule please contact [facility] directly at [phone].  Normal or non-critical lab and imaging results will be communicated to you by MyChart, letter or phone within 4 business days after the clinic has received the results. If you do not hear from us within 7 days, please contact the clinic through [employer] or phone. If you have a critical or abnormal lab result, we will notify you by phone as soon as possible.  Submit refill requests through [employer] or call your pharmacy and they will forward the refill request to us. Please allow 3 business days for your refill to be completed.          Additional Information About Your Visit        [facility] Hospital[facility] Information     [employer] gives you secure access to your electronic health record. If you see a primary care provider, you can also send messages to your care team and make appointments. If you have questions, please call your primary care clinic.  If you do not have a primary care provider, please call [phone] and they will assist you.        Care EveryWhere ID     This is your Care EveryWhere ID. This could be used by other organizations to access your [place] medical records  [id number]        Your Vitals Were     Pulse Temperature Respirations Height Last Period Pulse Oximetry    68 97.7  F (36.5  C) (Oral) 16 5' 6.75\" (1.695 m) 01/22/2018 99%    BMI (Body Mass Index)                   19.72 kg/m2            Blood Pressure from Last 3 Encounters:   01/30/18 111/72   01/26/18 118/76   04/28/17 112/78    Weight from Last 3 Encounters:   01/30/18 125 lb (56.7 kg)   01/26/18 127 lb (57.6 kg)   04/28/17 124 lb (56.2 kg)              We Performed the Following     Pap imaged thin layer screen with HPV - recommended age 30 - 65 years (select HPV order below)        Primary Care Provider Office Phone # Fax #    Jacque Marina [phone]       " [address] [facility]   [facility] [address]        Equal Access to Services     JUAREZ MEGHAN : Hadii aad ku hadottobilly Bowling, nancy mark, farhandari hendricksmamagalis rocha, jacinto manueltrinoroberto carlos augustin. So [facility] [phone].    ATENCIÓN: Si habla español, tiene a cedeno disposición servicios gratuitos de asistencia lingüística. Llame al [phone].    We comply with applicable federal civil rights laws and [place] laws. We do not discriminate on the basis of race, color, national origin, age, disability, sex, sexual orientation, or gender identity.            Thank you!     Thank you for choosing [facility]  for your care. Our goal is always to provide you with excellent care. Hearing back from our patients is one way we can continue to improve our services. Please take a few minutes to complete the written survey that you may receive in the mail after your visit with us. Thank you!             Your Updated Medication List - Protect others around you: Learn how to safely use, store and throw away your medicines at www.disposemymeds.org.          This list is accurate as of 1/30/18 12:01 PM.  Always use your most recent med list.                   Brand Name Dispense Instructions for use Diagnosis    * acyclovir 400 MG tablet    ZOVIRAX    30 tablet    Take 1 tablet (400 mg) by mouth 3 times daily    HSV (herpes simplex virus) infection       * acyclovir 200 MG capsule   Start taking on:  2/12/2018    ZOVIRAX    25 capsule    Take 1 capsule (200 mg) by mouth 5 times daily    HSV (herpes simplex virus) infection       triamcinolone 0.1 % cream    KENALOG    60 g    Apply 60 g topically 2 times daily.    Rash       * Notice:  This list has 2 medication(s) that are the same as other medications prescribed for you. Read the directions carefully, and ask your doctor or other care provider to review them with you.

## 2018-01-30 NOTE — PROGRESS NOTES
SUBJECTIVE:   CC: Tracy Dempsey is an 37 year old woman who presents for preventive health visit.     Healthy Habits:    Do you get at least three servings of calcium containing foods daily (dairy, green leafy vegetables, etc.)? yes    Amount of exercise or daily activities, outside of work: 0 day(s) per week    Problems taking medications regularly No    Medication side effects: No    Have you had an eye exam in the past two years? yes    Do you see a dentist twice per year? yes    Do you have sleep apnea, excessive snoring or daytime drowsiness?no      PROBLEMS TO ADD ON...    Today's PHQ-2 Score:   PHQ-2 ( 1999 [employer]) 1/27/2017 5/22/2015   Q1: Little interest or pleasure in doing things 0 0   Q2: Feeling down, depressed or hopeless 0 0   PHQ-2 Score 0 0       Abuse: Current or Past(Physical, Sexual or Emotional)- No  Do you feel safe in your environment - Yes    Social History   Substance Use Topics     Smoking status: Never Smoker     Smokeless tobacco: Never Used     Alcohol use 0.0 oz/week     0 Standard drinks or equivalent per week      Comment: 2 drinks daily     If you drink alcohol do you typically have >3 drinks per day or >7 drinks per week? No                     Reviewed orders with patient.  Reviewed health maintenance and updated orders accordingly - Yes  Labs reviewed in EPIC    Mammogram not appropriate for this patient based on age.    Pertinent mammograms are reviewed under the imaging tab.  History of abnormal Pap smear: NO - age 30- 65 PAP every 3 years recommended    Reviewed and updated as needed this visit by clinical staff  Tobacco         Reviewed and updated as needed this visit by Provider        No past medical history on file.   No past surgical history on file.    ROS:  C: NEGATIVE for fever, chills, change in weight  I: NEGATIVE for worrisome rashes, moles or lesions  E: NEGATIVE for vision changes or irritation  ENT: NEGATIVE for ear, mouth and throat problems  R: NEGATIVE for  "significant cough or SOB  B: NEGATIVE for masses, tenderness or discharge  CV: NEGATIVE for chest pain, palpitations or peripheral edema  GI: NEGATIVE for nausea, abdominal pain, heartburn, or change in bowel habits  : NEGATIVE for unusual urinary or vaginal symptoms. Periods are regular.  M: NEGATIVE for significant arthralgias or myalgia  N: NEGATIVE for weakness, dizziness or paresthesias  P: NEGATIVE for changes in mood or affect    OBJECTIVE:   /72 (BP Location: Left arm, Cuff Size: Adult Regular)  Pulse 68  Temp 97.7  F (36.5  C) (Oral)  Resp 16  Ht 5' 6.75\" (1.695 m)  Wt 125 lb (56.7 kg)  LMP 01/22/2018  SpO2 99%  BMI 19.72 kg/m2  EXAM:  GENERAL: healthy, alert and no distress  EYES: Eyes grossly normal to inspection, PERRL and conjunctivae and sclerae normal  HENT: ear canals and TM's normal, nose and mouth without ulcers or lesions  NECK: no adenopathy, no asymmetry, masses, or scars and thyroid normal to palpation  RESP: lungs clear to auscultation - no rales, rhonchi or wheezes  BREAST: normal without masses, tenderness or nipple discharge and no palpable axillary masses or adenopathy  CV: regular rate and rhythm, normal S1 S2, no S3 or S4, no murmur, click or rub, no peripheral edema and peripheral pulses strong  ABDOMEN: soft, nontender, no hepatosplenomegaly, no masses and bowel sounds normal   (female): normal female external genitalia, normal urethral meatus, vaginal mucosa pink, moist, well rugated, and normal cervix/adnexa/uterus without masses or discharge  MS: no gross musculoskeletal defects noted, no edema  SKIN: no suspicious lesions or rashes  NEURO: Normal strength and tone, mentation intact and speech normal  PSYCH: mentation appears normal, affect normal/bright    ASSESSMENT/PLAN:   1. Encounter for routine adult health examination without abnormal findings  Discussed diet,calcium,exercise.Went over self breast exam.Thin prep was done.Eyes and teeth UTD.No immunizations " "needed today.See orders below for tests ordered and screening needed.    - Pap imaged thin layer screen with HPV - recommended age 30 - 65 years (select HPV order below)    2. HSV (herpes simplex virus) infection  She has gotten two different Rx for the genital herpes yesterday and not sure which one she is supposed to take , discussed to take the 400 mg of the acyclovir three xa day for five days with exacerbations, so far she has had only two -one in Nov 2017 and one now last week.      COUNSELING:   Reviewed preventive health counseling, as reflected in patient instructions       Regular exercise       Healthy diet/nutrition       Vision screening       Contraception       Family planning       Folic Acid Counseling         reports that she has never smoked. She has never used smokeless tobacco.    Estimated body mass index is 19.72 kg/(m^2) as calculated from the following:    Height as of this encounter: 5' 6.75\" (1.695 m).    Weight as of this encounter: 125 lb (56.7 kg).       Counseling Resources:  ATP IV Guidelines  Pooled Cohorts Equation Calculator  Breast Cancer Risk Calculator  FRAX Risk Assessment  ICSI Preventive Guidelines  Dietary Guidelines for Americans, 2010  USDA's MyPlate  ASA Prophylaxis  Lung CA Screening    Jacque Marina MD  [facility]  "

## 2018-01-30 NOTE — NURSING NOTE
"Chief Complaint   Patient presents with     Physical     initial /72 (BP Location: Left arm, Cuff Size: Adult Regular)  Pulse 68  Temp 97.7  F (36.5  C) (Oral)  Resp 16  Ht 5' 6.75\" (1.695 m)  Wt 125 lb (56.7 kg)  LMP 01/22/2018  SpO2 99%  BMI 19.72 kg/m2 Estimated body mass index is 19.72 kg/(m^2) as calculated from the following:    Height as of this encounter: 5' 6.75\" (1.695 m).    Weight as of this encounter: 125 lb (56.7 kg).  BP completed using cuff size: regular.  L  arm      Health Maintenance that is potentially due pending provider review:  Pap Smear    n/a    Scott Pike ma  "

## 2018-02-01 LAB
COPATH REPORT: NORMAL
PAP: NORMAL

## 2018-02-05 LAB
FINAL DIAGNOSIS: NORMAL
HPV HR 12 DNA CVX QL NAA+PROBE: NEGATIVE
HPV16 DNA SPEC QL NAA+PROBE: NEGATIVE
HPV18 DNA SPEC QL NAA+PROBE: NEGATIVE
SPECIMEN DESCRIPTION: NORMAL
SPECIMEN SOURCE CVX/VAG CYTO: NORMAL

## 2018-07-25 ENCOUNTER — VIRTUAL VISIT (OUTPATIENT)
Dept: FAMILY MEDICINE | Facility: CLINIC | Age: 38
End: 2018-07-25

## 2018-07-25 NOTE — MR AVS SNAPSHOT
After Visit Summary   7/25/2018    Tracy Dempsey    MRN: [id number]           Patient Information     Date Of Birth          1980        Visit Information        Provider Department      7/25/2018 11:59 PM Harini Harden APRN [facility]         Follow-ups after your visit        Who to contact     If you have questions or need follow up information about today's clinic visit or your schedule please contact [facility] directly at [phone].  Normal or non-critical lab and imaging results will be communicated to you by MyChart, letter or phone within 4 business days after the clinic has received the results. If you do not hear from us within 7 days, please contact the clinic through [employer] or phone. If you have a critical or abnormal lab result, we will notify you by phone as soon as possible.  Submit refill requests through [employer] or call your pharmacy and they will forward the refill request to us. Please allow 3 business days for your refill to be completed.          Additional Information About Your Visit        MyChart Information     [employer] gives you secure access to your electronic health record. If you see a primary care provider, you can also send messages to your care team and make appointments. If you have questions, please call your primary care clinic.  If you do not have a primary care provider, please call [phone] and they will assist you.        Care EveryWhere ID     This is your Care EveryWhere ID. This could be used by other organizations to access your [place] medical records  [id number]         Blood Pressure from Last 3 Encounters:   No data found for BP    Weight from Last 3 Encounters:   No data found for Wt              Today, you had the following     No orders found for display       Primary Care Provider Office Phone # Fax #    Jacque Marina [phone]       [address]  [place] [address]        Equal Access to Services     [facility]: Hadii aad francia scott Solomonali, waabdimagalis stringerliaha, ramseydarlin chavesrtippjacinto ramos. So [facility] [phone].    ATENCIÓN: Si habla español, tiene a cedeno disposición servicios gratuitos de asistencia lingüística. Elsyame al [phone].    We comply with applicable federal civil rights laws and [place] laws. We do not discriminate on the basis of race, color, national origin, age, disability, sex, sexual orientation, or gender identity.            Thank you!     Thank you for choosing [facility]  for your care. Our goal is always to provide you with excellent care. Hearing back from our patients is one way we can continue to improve our services. Please take a few minutes to complete the written survey that you may receive in the mail after your visit with us. Thank you!             Your Updated Medication List - Protect others around you: Learn how to safely use, store and throw away your medicines at www.disposemymeds.org.          This list is accurate as of 7/25/18 11:59 PM.  Always use your most recent med list.                   Brand Name Dispense Instructions for use Diagnosis    * acyclovir 400 MG tablet    ZOVIRAX    30 tablet    Take 1 tablet (400 mg) by mouth 3 times daily    HSV (herpes simplex virus) infection       * acyclovir 200 MG capsule    ZOVIRAX    25 capsule    Take 1 capsule (200 mg) by mouth 5 times daily    HSV (herpes simplex virus) infection       triamcinolone 0.1 % cream    KENALOG    60 g    Apply 60 g topically 2 times daily.    Rash       * Notice:  This list has 2 medication(s) that are the same as other medications prescribed for you. Read the directions carefully, and ask your doctor or other care provider to review them with you.

## 2018-07-26 NOTE — PROGRESS NOTES
"Date:   Clinician: Harini Harden  Clinician NPI: [id number]  Patient: Tracy Dempsey  Patient : 1980  Patient Address: [address] [place] [address] [place] [address]  Patient Phone: [phone]  Visit Protocol: UTI  Patient Summary:  Tracy is a 37 year old ( : 1980 ) female who initiated a Visit for a presumed bladder infection. When asked the question \"Please sign me up to receive news, health information and promotions. \", Tracy responded \"No\".    Her symptoms began today and consist of dysuria, urgency, and urinary frequency.   Symptom Details   Urinary Frequency: Several times each hour    She denies foul smelling urine, nausea, hesitation, hematuria, urinary incontinence, vaginal discharge, abdominal pain, recent antibiotic use, chills, vomiting, loss of appetite, feeling feverish, and flank pain. Tracy has never had kidney stones. She has not been hospitalized, been a patient in a nursing home, or had a catheter in the past two weeks. She denies risk factors for a sexually transmitted disease.  Tracy has not had any UTIs in the past 12 months. Her current symptoms are similar to the previous UTI symptoms. She took an antibiotic for her last infection but does not remember which one.   She has experienced side effects (upset stomach, vomiting, or diarrhea) from taking Bactrim DS (trimethoprim/sulfamethoxazole). Tracy does not get yeast infections when she takes antibiotics.   She denies pregnancy and denies breastfeeding. She has menstruated in the past month.   MEDICATIONS: No current medications, ALLERGIES: NKDA  Clinician Response:  Dear Tracy,  Based on the information you have provided, you likely have a bladder infection, also called acute urinary tract infection (UTI).   To treat your infection, I am prescribing:   Cephalexin (Keflex) 500 mg oral capsule. Take 1 capsule by mouth every 12 hours for 7 days. Continue taking the capsules even if you feel better before all of the " medication is gone. There are no refills with this prescription.  Some women may develop a yeast infection as a side effect of taking antibiotics. If you notice symptoms of a yeast infection, [facility] can help treat that condition as well. Simply log in and complete another Visit, which will cover all of the necessary questions to determine the best treatment for you.   Some people develop allergies to antibiotics. If you notice a new rash, significant swelling, or difficulty breathing, stop the medication immediately and go into a clinic for physical evaluation.   If you become pregnant during this course of treatment, stop taking the medication and contact your primary care provider.   To help treat your current UTI and prevent future occurrences, remember to:     Drink 8-10, 8-ounce glasses of water daily.    Urinate after sexual intercourse.    Wipe front to back after using the bathroom.     You should visit a clinic for a follow-up visit if your symptoms do not improve in 1-2 days or if you experience another urinary tract infection soon after completing this treatment.   Diagnosis: Acute uncomplicated bladder infection  Diagnosis ICD: N39.0  Prescription: cephalexin (Keflex) 500 mg oral capsule 14 capsule, 7 days supply. Take 1 capsule by mouth every 12 hours for 7 days. Refills: 0, Refill as needed: no, Allow substitutions: yes  Pharmacy: [id number] - [phone] - [address] [place] [address]

## 2018-08-15 DIAGNOSIS — B00.9 HSV (HERPES SIMPLEX VIRUS) INFECTION: ICD-10-CM

## 2018-08-16 RX ORDER — ACYCLOVIR 400 MG/1
400 TABLET ORAL 3 TIMES DAILY
Qty: 30 TABLET | Refills: 0 | Status: SHIPPED | OUTPATIENT
Start: 2018-08-16 | End: 2019-01-17

## 2018-08-16 NOTE — TELEPHONE ENCOUNTER
"Routing refill request to provider for review/approval because:  Labs not current:  Yearly creatinine needed  Tammy ARROYO RN    Requested Prescriptions   Pending Prescriptions Disp Refills     acyclovir (ZOVIRAX) 400 MG tablet 30 tablet 0     Sig: Take 1 tablet (400 mg) by mouth 3 times daily    Antivirals for Herpes Protocol Failed    8/15/2018  3:48 PM       Failed - Normal serum creatinine on file in past 12 months    No lab results found.         Passed - Patient is age 12 or older       Passed - Recent (12 mo) or future (30 days) visit within the authorizing provider's specialty    Patient had office visit in the last 12 months or has a visit in the next 30 days with authorizing provider or within the authorizing provider's specialty.  See \"Patient Info\" tab in inbasket, or \"Choose Columns\" in Meds & Orders section of the refill encounter.                  "

## 2019-01-17 DIAGNOSIS — B00.9 HSV (HERPES SIMPLEX VIRUS) INFECTION: ICD-10-CM

## 2019-01-18 RX ORDER — ACYCLOVIR 400 MG/1
TABLET ORAL
Qty: 30 TABLET | Refills: 0 | Status: SHIPPED | OUTPATIENT
Start: 2019-01-18 | End: 2021-12-03

## 2019-01-18 NOTE — TELEPHONE ENCOUNTER
"Prescription approved per [facility] Refill Protocol.  Tammy ARROYO RN    Requested Prescriptions   Pending Prescriptions Disp Refills     acyclovir (ZOVIRAX) 400 MG tablet [Pharmacy Med Name: ACYCLOVIR 400MG TABLETS] 30 tablet 0     Sig: TAKE 1 TABLET(400 MG) BY MOUTH THREE TIMES DAILY    Antivirals for Herpes Protocol Failed - 1/17/2019  2:47 PM       Failed - Normal serum creatinine on file in past 12 months    No lab results found.         Passed - Patient is age 12 or older       Passed - Recent (12 mo) or future (30 days) visit within the authorizing provider's specialty    Patient had office visit in the last 12 months or has a visit in the next 30 days with authorizing provider or within the authorizing provider's specialty.  See \"Patient Info\" tab in inbasket, or \"Choose Columns\" in Meds & Orders section of the refill encounter.             Passed - Medication is active on med list        Next 5 appointments (look out 90 days)    Jan 22, 2019  9:20 AM CST  PHYSICAL with Jacque Marina MD  [facility] ([facility]) [address] [facility] [id number] [phone]          "

## 2019-01-22 ENCOUNTER — OFFICE VISIT (OUTPATIENT)
Dept: FAMILY MEDICINE | Facility: CLINIC | Age: 39
End: 2019-01-22
Payer: COMMERCIAL

## 2019-01-22 VITALS
DIASTOLIC BLOOD PRESSURE: 76 MMHG | HEIGHT: 67 IN | HEART RATE: 77 BPM | TEMPERATURE: 98.3 F | BODY MASS INDEX: 19.7 KG/M2 | RESPIRATION RATE: 16 BRPM | WEIGHT: 125.5 LBS | SYSTOLIC BLOOD PRESSURE: 111 MMHG | OXYGEN SATURATION: 99 %

## 2019-01-22 DIAGNOSIS — Z00.00 ENCOUNTER FOR ROUTINE ADULT HEALTH EXAMINATION WITHOUT ABNORMAL FINDINGS: Primary | ICD-10-CM

## 2019-01-22 PROCEDURE — 99395 PREV VISIT EST AGE 18-39: CPT | Performed by: FAMILY MEDICINE

## 2019-01-22 ASSESSMENT — MIFFLIN-ST. JEOR: SCORE: 1281.89

## 2019-01-22 NOTE — PROGRESS NOTES
SUBJECTIVE:   CC: Tracy Dempsey is an 38 year old woman who presents for preventive health visit.     Physical   Annual:     Getting at least 3 servings of Calcium per day:  Yes    Bi-annual eye exam:  NO    Dental care twice a year:  Yes    Sleep apnea or symptoms of sleep apnea:  None    Diet:  Regular (no restrictions)    Frequency of exercise:  1 day/week    Duration of exercise:  Less than 15 minutes    Taking medications regularly:  Yes    Medication side effects:  Not applicable    Additional concerns today:  Yes    PHQ-2 Total Score: 0      1) she and her  have started trying to get pregnant but has not been more than a few days , she has regualr monthly periods and LMP was about 10 days ago. Partner lives in [place] so they try only when he is in town         Today's PHQ-2 Score:   PHQ-2 ( 1999 Pfizer) 1/22/2019   Q1: Little interest or pleasure in doing things 0   Q2: Feeling down, depressed or hopeless 0   PHQ-2 Score 0   Q1: Little interest or pleasure in doing things Not at all   Q2: Feeling down, depressed or hopeless Not at all   PHQ-2 Score 0       Abuse: Current or Past(Physical, Sexual or Emotional)- No  Do you feel safe in your environment? Yes    Social History     Tobacco Use     Smoking status: Never Smoker     Smokeless tobacco: Never Used   Substance Use Topics     Alcohol use: Yes     Alcohol/week: 0.0 oz     Comment: 2 drinks daily     Alcohol Use 1/22/2019   If you drink alcohol do you typically have greater than 3 drinks per day OR greater than 7 drinks per week? No       Reviewed orders with patient.  Reviewed health maintenance and updated orders accordingly - Yes  Labs reviewed in EPIC  BP Readings from Last 3 Encounters:   01/22/19 111/76   01/30/18 111/72   01/26/18 118/76    Wt Readings from Last 3 Encounters:   01/22/19 56.9 kg (125 lb 8 oz)   01/30/18 56.7 kg (125 lb)   01/26/18 57.6 kg (127 lb)                  Patient Active Problem List   Diagnosis      CARDIOVASCULAR SCREENING; LDL GOAL LESS THAN 160     Cervicalgia     HSV (herpes simplex virus) infection     No past surgical history on file.    Social History     Tobacco Use     Smoking status: Never Smoker     Smokeless tobacco: Never Used   Substance Use Topics     Alcohol use: Yes     Alcohol/week: 0.0 oz     Comment: 2 drinks daily     Family History   Problem Relation Age of Onset     Hypertension Father      Alcohol/Drug Father      Cancer Maternal Grandmother         breast     Neurologic Disorder Paternal Grandmother         als     Hypertension Paternal Grandfather      Cerebrovascular Disease Paternal Grandfather      Neurologic Disorder Sister         MS may be     Thyroid Disease Sister          Current Outpatient Medications   Medication Sig Dispense Refill     acyclovir (ZOVIRAX) 200 MG capsule Take 1 capsule (200 mg) by mouth 5 times daily (Patient not taking: Reported on 1/22/2019) 25 capsule 5     acyclovir (ZOVIRAX) 400 MG tablet TAKE 1 TABLET(400 MG) BY MOUTH THREE TIMES DAILY 30 tablet 0     triamcinolone (KENALOG) 0.1 % cream Apply 60 g topically 2 times daily. 60 g 0     Allergies   Allergen Reactions     Nickel      Recent Labs   Lab Test 01/27/17  0916 05/22/15  0859 06/07/11  0856   LDL 68 77 94   HDL 75 80 60   TRIG 50 68 71   TSH 1.94  --  1.91        Mammogram not appropriate for this patient based on age.    Pertinent mammograms are reviewed under the imaging tab.  History of abnormal Pap smear: NO - age 30- 65 PAP every 3 years recommended  PAP / HPV Latest Ref Rng & Units 1/30/2018 5/22/2015 7/27/2012   PAP - NIL NIL NIL   HPV 16 DNA NEG:Negative Negative Negative -   HPV 18 DNA NEG:Negative Negative Negative -   OTHER HR HPV NEG:Negative Negative Negative -     Reviewed and updated as needed this visit by clinical staff  Tobacco  Allergies  Meds         Reviewed and updated as needed this visit by Provider        No past medical history on file.   No past surgical history on  "file.    Review of Systems  CONSTITUTIONAL: NEGATIVE for fever, chills, change in weight  INTEGUMENTARU/SKIN: NEGATIVE for worrisome rashes, moles or lesions  EYES: NEGATIVE for vision changes or irritation  ENT: NEGATIVE for ear, mouth and throat problems  RESP: NEGATIVE for significant cough or SOB  BREAST: NEGATIVE for masses, tenderness or discharge  CV: NEGATIVE for chest pain, palpitations or peripheral edema  GI: NEGATIVE for nausea, abdominal pain, heartburn, or change in bowel habits  : NEGATIVE for unusual urinary or vaginal symptoms. Periods are regular.  MUSCULOSKELETAL: NEGATIVE for significant arthralgias or myalgia  NEURO: NEGATIVE for weakness, dizziness or paresthesias  PSYCHIATRIC: NEGATIVE for changes in mood or affect     OBJECTIVE:   /76 (BP Location: Right arm, Cuff Size: Adult Regular)   Pulse 77   Temp 98.3  F (36.8  C) (Oral)   Resp 16   Ht 1.702 m (5' 7\")   Wt 56.9 kg (125 lb 8 oz)   LMP 01/09/2019   SpO2 99%   BMI 19.66 kg/m    Physical Exam  GENERAL: healthy, alert and no distress  EYES: Eyes grossly normal to inspection, PERRL and conjunctivae and sclerae normal  HENT: ear canals and TM's normal, nose and mouth without ulcers or lesions  NECK: no adenopathy, no asymmetry, masses, or scars and thyroid normal to palpation  RESP: lungs clear to auscultation - no rales, rhonchi or wheezes  BREAST: normal without masses, tenderness or nipple discharge and no palpable axillary masses or adenopathy  CV: regular rate and rhythm, normal S1 S2, no S3 or S4, no murmur, click or rub, no peripheral edema and peripheral pulses strong  ABDOMEN: soft, nontender, no hepatosplenomegaly, no masses and bowel sounds normal  MS: no gross musculoskeletal defects noted, no edema  SKIN: no suspicious lesions or rashes  NEURO: Normal strength and tone, mentation intact and speech normal  PSYCH: mentation appears normal, affect normal/bright    Diagnostic Test Results:  none     ASSESSMENT/PLAN: " "  (Z00.00) Encounter for routine adult health examination without abnormal findings  (primary encounter diagnosis)  Comment: Discussed diet,calcium,exercise.Went over self breast exam.Thin prep was NOT  done.Eyes and teeth UTD.No immunizations needed today.See orders below for tests ordered and screening needed.    Plan: f/u as needed , if not pregnant within 6 months would need to do a fertility referral because of age . Pt is aware  and comfortable with the current plan.          COUNSELING:  Reviewed preventive health counseling, as reflected in patient instructions       Regular exercise       Healthy diet/nutrition       Vision screening       Family planning       Folic Acid Counseling    BP Readings from Last 1 Encounters:   01/22/19 111/76     Estimated body mass index is 19.66 kg/m  as calculated from the following:    Height as of this encounter: 1.702 m (5' 7\").    Weight as of this encounter: 56.9 kg (125 lb 8 oz).           reports that  has never smoked. she has never used smokeless tobacco.      Counseling Resources:  ATP IV Guidelines  Pooled Cohorts Equation Calculator  Breast Cancer Risk Calculator  FRAX Risk Assessment  ICSI Preventive Guidelines  Dietary Guidelines for Americans, 2010  USDA's MyPlate  ASA Prophylaxis  Lung CA Screening    Jacque Marina MD  [facility]  "

## 2019-04-03 DIAGNOSIS — B00.9 HSV (HERPES SIMPLEX VIRUS) INFECTION: ICD-10-CM

## 2019-04-03 NOTE — TELEPHONE ENCOUNTER
"Last Written Prescription Date:  01/18/2019  Last Fill Quantity: 30,  # refills: 0   Last office visit: 1/22/2019   Future Office Visit:      Requested Prescriptions   Pending Prescriptions Disp Refills     acyclovir (ZOVIRAX) 400 MG tablet [Pharmacy Med Name: ACYCLOVIR 400MG TABLETS] 30 tablet 0     Sig: TAKE 1 TABLET(400 MG) BY MOUTH THREE TIMES DAILY    Antivirals for Herpes Protocol Failed - 4/3/2019  9:25 AM       Failed - Normal serum creatinine on file in past 12 months    No lab results found.         Passed - Patient is age 12 or older       Passed - Recent (12 mo) or future (30 days) visit within the authorizing provider's specialty    Patient had office visit in the last 12 months or has a visit in the next 30 days with authorizing provider or within the authorizing provider's specialty.  See \"Patient Info\" tab in inbasket, or \"Choose Columns\" in Meds & Orders section of the refill encounter.             Passed - Medication is active on med list        "

## 2019-04-03 NOTE — TELEPHONE ENCOUNTER
FS  Please address due to LS absence.    Routing refill request to provider for review/approval because:  Labs not current:  Annual creatinine required.  No result in chart.  Last Rx 1/18/19 for  #30 with 0 refills  Last OV 1/22/19  Thanks,  Kelsea Ryan RN

## 2019-04-05 RX ORDER — ACYCLOVIR 400 MG/1
400 TABLET ORAL EVERY 8 HOURS
Qty: 15 TABLET | Refills: 0 | Status: SHIPPED | OUTPATIENT
Start: 2019-04-05 | End: 2019-08-07

## 2019-04-05 RX ORDER — ACYCLOVIR 400 MG/1
TABLET ORAL
Qty: 30 TABLET | Refills: 0 | OUTPATIENT
Start: 2019-04-05

## 2019-04-05 NOTE — TELEPHONE ENCOUNTER
Medication sent to patient's pharmacy.     Dose of the Rx is for intermittent use not chronic daily use.     Rimma

## 2019-07-02 ENCOUNTER — OFFICE VISIT (OUTPATIENT)
Dept: FAMILY MEDICINE | Facility: CLINIC | Age: 39
End: 2019-07-02
Payer: COMMERCIAL

## 2019-07-02 VITALS
WEIGHT: 126 LBS | TEMPERATURE: 97.6 F | BODY MASS INDEX: 19.78 KG/M2 | RESPIRATION RATE: 12 BRPM | HEART RATE: 79 BPM | OXYGEN SATURATION: 100 % | DIASTOLIC BLOOD PRESSURE: 73 MMHG | HEIGHT: 67 IN | SYSTOLIC BLOOD PRESSURE: 112 MMHG

## 2019-07-02 DIAGNOSIS — Z31.69 INFERTILITY COUNSELING: Primary | ICD-10-CM

## 2019-07-02 PROCEDURE — 99213 OFFICE O/P EST LOW 20 MIN: CPT | Performed by: FAMILY MEDICINE

## 2019-07-02 ASSESSMENT — MIFFLIN-ST. JEOR: SCORE: 1284.16

## 2019-07-02 NOTE — PROGRESS NOTES
Subjective     Tracy Dempsey is a 38 year old female who presents to clinic today for the following health issues:    HPI     Patient is here for a fertility consult.  Pt is here with her  , they have been trying to get pregnant for six months , she has a monthly regular period q 29 to q 30 days. She has been checking ovulation and it turns positive ( ovulation kit ) and that is when they are trying . She does not take daily medications except PNV. Healthy and last PAP has been normal , her  is healthy too .      Patient Active Problem List   Diagnosis     CARDIOVASCULAR SCREENING; LDL GOAL LESS THAN 160     Cervicalgia     HSV (herpes simplex virus) infection     No past surgical history on file.    Social History     Tobacco Use     Smoking status: Never Smoker     Smokeless tobacco: Never Used   Substance Use Topics     Alcohol use: Yes     Alcohol/week: 0.0 oz     Comment: 2 drinks daily     Family History   Problem Relation Age of Onset     Hypertension Father      Alcohol/Drug Father      Cancer Maternal Grandmother         breast     Neurologic Disorder Paternal Grandmother         als     Hypertension Paternal Grandfather      Cerebrovascular Disease Paternal Grandfather      Neurologic Disorder Sister         MS may be     Thyroid Disease Sister          Current Outpatient Medications   Medication Sig Dispense Refill     acyclovir (ZOVIRAX) 400 MG tablet TAKE 1 TABLET(400 MG) BY MOUTH THREE TIMES DAILY 30 tablet 0     acyclovir (ZOVIRAX) 200 MG capsule Take 1 capsule (200 mg) by mouth 5 times daily (Patient not taking: Reported on 1/22/2019) 25 capsule 5     acyclovir (ZOVIRAX) 400 MG tablet Take 1 tablet (400 mg) by mouth every 8 hours for 5 days 15 tablet 0     triamcinolone (KENALOG) 0.1 % cream Apply 60 g topically 2 times daily. (Patient not taking: Reported on 7/2/2019) 60 g 0     Allergies   Allergen Reactions     Nickel      Recent Labs   Lab Test 01/27/17  0916 05/22/15  0887  06/07/11  0856   LDL 68 77 94   HDL 75 80 60   TRIG 50 68 71   TSH 1.94  --  1.91      BP Readings from Last 3 Encounters:   07/02/19 112/73   01/22/19 111/76   01/30/18 111/72    Wt Readings from Last 3 Encounters:   07/02/19 57.2 kg (126 lb)   01/22/19 56.9 kg (125 lb 8 oz)   01/30/18 56.7 kg (125 lb)                      Reviewed and updated as needed this visit by Provider         Review of Systems   ROS COMP: Constitutional, HEENT, cardiovascular, pulmonary, GI, , musculoskeletal, neuro, skin, endocrine and psych systems are negative, except as otherwise noted.      Objective    There were no vitals taken for this visit.  There is no height or weight on file to calculate BMI.  Physical Exam   GENERAL: healthy, alert and no distress  NECK: no adenopathy, no asymmetry, masses, or scars and thyroid normal to palpation  MS: no gross musculoskeletal defects noted, no edema    Diagnostic Test Results:  Labs reviewed in Epic  none         Assessment & Plan     1. Infertility counseling  Discussed infertility referral since they have been trying over 6 months and she is 38 and  is 46 , she does have good , regualr cycles and checks for ovulation- referral given .  - INFERTILITY/AI REFERRAL       RTC if no improving or worsening.  Pt is aware  and comfortable with the current plan.          Jacque Marina MD  [facility]

## 2019-07-02 NOTE — NURSING NOTE
"Chief Complaint   Patient presents with     Fertility     /73 (BP Location: Left arm, Patient Position: Sitting, Cuff Size: Adult Regular)   Pulse 79   Temp 97.6  F (36.4  C) (Oral)   Resp 12   Ht 1.702 m (5' 7\")   Wt 57.2 kg (126 lb)   SpO2 100%   Breastfeeding? No   BMI 19.73 kg/m   Estimated body mass index is 19.73 kg/m  as calculated from the following:    Height as of this encounter: 1.702 m (5' 7\").    Weight as of this encounter: 57.2 kg (126 lb).  bp completed using cuff size: regular       Health Maintenance addressed:  NONE    n/a    Laron Reyna MA     "

## 2019-08-05 ENCOUNTER — TELEPHONE (OUTPATIENT)
Dept: FAMILY MEDICINE | Facility: CLINIC | Age: 39
End: 2019-08-05

## 2019-08-05 NOTE — TELEPHONE ENCOUNTER
Reason for Call:  Pregnancy    Detailed comments: she was referred to a fertility Dr but said she took a pregnancy test which  Was Positive and she has some questions re this    Phone Number Patient can be reached at: Home number on file [phone] (home)    Best Time: anytime    Can we leave a detailed message on this number? YES    Call taken on 8/5/2019 at 9:11 AM by Deacon Smith

## 2019-08-05 NOTE — TELEPHONE ENCOUNTER
Pt calling about pregnancy care  Has appt with OB  Wants to know if should just keep appt there or if can come here  Was having fertility issues  Now took HPT - positive  Advised appt here for pregnancy confirmation    Next 5 appointments (look out 90 days)    Aug 07, 2019  9:40 AM CDT  SHORT with Jacque Marina MD  [facility] ([facility]) [address]  [facility] [id number] [phone]        Tammy ARROYO RN

## 2019-08-07 ENCOUNTER — TRANSCRIBE ORDERS (OUTPATIENT)
Dept: MATERNAL FETAL MEDICINE | Facility: CLINIC | Age: 39
End: 2019-08-07

## 2019-08-07 ENCOUNTER — OFFICE VISIT (OUTPATIENT)
Dept: FAMILY MEDICINE | Facility: CLINIC | Age: 39
End: 2019-08-07
Payer: COMMERCIAL

## 2019-08-07 VITALS
DIASTOLIC BLOOD PRESSURE: 68 MMHG | WEIGHT: 125.8 LBS | OXYGEN SATURATION: 97 % | SYSTOLIC BLOOD PRESSURE: 98 MMHG | RESPIRATION RATE: 16 BRPM | HEART RATE: 77 BPM | BODY MASS INDEX: 19.74 KG/M2 | HEIGHT: 67 IN | TEMPERATURE: 98 F

## 2019-08-07 DIAGNOSIS — O26.90 PREGNANCY RELATED CONDITION, ANTEPARTUM: Primary | ICD-10-CM

## 2019-08-07 DIAGNOSIS — O09.511 ADVANCED MATERNAL AGE, PRIMIGRAVIDA IN FIRST TRIMESTER, ANTEPARTUM: ICD-10-CM

## 2019-08-07 DIAGNOSIS — O09.511 SUPERVISION OF ELDERLY PRIMIGRAVIDA IN FIRST TRIMESTER: Primary | ICD-10-CM

## 2019-08-07 DIAGNOSIS — Z32.00 VISIT FOR CONFIRMATION OF PREGNANCY TEST RESULT WITH PHYSICAL EXAM: ICD-10-CM

## 2019-08-07 LAB — HCG UR QL: POSITIVE

## 2019-08-07 PROCEDURE — 81025 URINE PREGNANCY TEST: CPT | Performed by: FAMILY MEDICINE

## 2019-08-07 PROCEDURE — 99213 OFFICE O/P EST LOW 20 MIN: CPT | Performed by: FAMILY MEDICINE

## 2019-08-07 ASSESSMENT — MIFFLIN-ST. JEOR: SCORE: 1283.26

## 2019-08-07 NOTE — NURSING NOTE
"Chief Complaint   Patient presents with     Confirmation Of Pregnancy     BP 98/68   Pulse 77   Temp 98  F (36.7  C) (Oral)   Resp 16   Ht 1.702 m (5' 7\")   Wt 57.1 kg (125 lb 12.8 oz)   LMP 07/05/2019   SpO2 97%   BMI 19.70 kg/m   Estimated body mass index is 19.7 kg/m  as calculated from the following:    Height as of this encounter: 1.702 m (5' 7\").    Weight as of this encounter: 57.1 kg (125 lb 12.8 oz).  bp completed using cuff size: regular      Health Maintenance addressed:  NONE    n/a    Ashwini Reddy MA    "

## 2019-08-07 NOTE — PROGRESS NOTES
Subjective     Tracy Dempsey is a 38 year old female who presents to clinic today for the following health issues:    HPI   Patient is here for pregnancy confirmation, 3 test positive at home, patient LMP was 07/05/19 which places her at [facility] of April 12 , 2020 .  They are both very excited about this pregnancy as it was planned and desired and they have been trying to get pregnant for 7 months .        Patient Active Problem List   Diagnosis     CARDIOVASCULAR SCREENING; LDL GOAL LESS THAN 160     Cervicalgia     HSV (herpes simplex virus) infection     Past Surgical History:   Procedure Laterality Date     BIOPSY  2009    Moles biopsied on back       Social History     Tobacco Use     Smoking status: Never Smoker     Smokeless tobacco: Never Used   Substance Use Topics     Alcohol use: Yes     Alcohol/week: 0.0 oz     Comment: 2 drinks daily     Family History   Problem Relation Age of Onset     Hypertension Father      Alcohol/Drug Father      Anxiety Disorder Father      Cancer Maternal Grandmother         breast     Breast Cancer Maternal Grandmother      Neurologic Disorder Paternal Grandmother         als     Genetic Disorder Paternal Grandmother         ALS     Hypertension Paternal Grandfather      Cerebrovascular Disease Paternal Grandfather      Neurologic Disorder Sister         MS may be     Thyroid Disease Sister      Thyroid Disease Sister          Current Outpatient Medications   Medication Sig Dispense Refill     acyclovir (ZOVIRAX) 400 MG tablet TAKE 1 TABLET(400 MG) BY MOUTH THREE TIMES DAILY 30 tablet 0     triamcinolone (KENALOG) 0.1 % cream Apply 60 g topically 2 times daily. 60 g 0     Allergies   Allergen Reactions     Nickel      Recent Labs   Lab Test 01/27/17  0916 05/22/15  0859 06/07/11  0856   LDL 68 77 94   HDL 75 80 60   TRIG 50 68 71   TSH 1.94  --  1.91      BP Readings from Last 3 Encounters:   08/07/19 98/68   07/02/19 112/73   01/22/19 111/76    Wt Readings from Last 3 Encounters:    08/07/19 57.1 kg (125 lb 12.8 oz)   07/02/19 57.2 kg (126 lb)   01/22/19 56.9 kg (125 lb 8 oz)                    Reviewed and updated as needed this visit by Provider         Review of Systems   ROS COMP: Constitutional, HEENT, cardiovascular, pulmonary, GI, , musculoskeletal, neuro, skin, endocrine and psych systems are negative, except as otherwise noted.      Objective    There were no vitals taken for this visit.  There is no height or weight on file to calculate BMI.  Physical Exam   GENERAL: healthy, alert and no distress  MS: no gross musculoskeletal defects noted, no edema    Diagnostic Test Results:  Labs reviewed in Epic  Results for orders placed or performed in visit on 08/07/19 (from the past 24 hour(s))   HCG Qual, Urine - [facility],  [place], [place]  ([id number])   Result Value Ref [place]    HCG Qual Urine Positive (A) NEG^Negative           Assessment & Plan     1. Supervision of elderly primigravida in first trimester  Confirmed pregnancy and since her LMP was July 5th, that places her at [facility] of April 12 , 2020 .    2. Advanced maternal age, primigravida in first trimester, antepartum  Referred to [facility] as she is 38 and this is her first pregnancy .  - [facility] REFERRAL-[facility]REGNANCY    3. Visit for confirmation of pregnancy test result with physical exam  As above , we discussed also prenatal visits, PNV , diet , exercise , office visits timeline , she has not decided yet where she will go for her prenatal care and would need to schedule her first OB visit in about a month to 6 weeks time .  - HCG Qual, Urine - [facility],  Rivka [place]uck[place]  ([id number])     All of her and her 's questions were answered .    Jacque Marina MD  [facility]

## 2019-09-17 ENCOUNTER — TELEPHONE (OUTPATIENT)
Dept: MATERNAL FETAL MEDICINE | Facility: CLINIC | Age: 39
End: 2019-09-17

## 2019-09-17 NOTE — TELEPHONE ENCOUNTER
"MFM received a referral from [facility] for FTS.  Patient called to cancel her 9/26/19 MFM appointment stating that she \"is seeing another provider.\"  Referring clinic notified, removing orders.    Krissy Richardson      "

## 2020-02-23 ENCOUNTER — HEALTH MAINTENANCE LETTER (OUTPATIENT)
Age: 40
End: 2020-02-23

## 2020-12-06 ENCOUNTER — HEALTH MAINTENANCE LETTER (OUTPATIENT)
Age: 40
End: 2020-12-06

## 2021-04-11 ENCOUNTER — HEALTH MAINTENANCE LETTER (OUTPATIENT)
Age: 41
End: 2021-04-11

## 2021-09-26 ENCOUNTER — HEALTH MAINTENANCE LETTER (OUTPATIENT)
Age: 41
End: 2021-09-26

## 2021-09-27 ASSESSMENT — ENCOUNTER SYMPTOMS
ABDOMINAL PAIN: 0
NAUSEA: 0
BREAST MASS: 0
WEAKNESS: 0
FEVER: 0
ARTHRALGIAS: 0
NERVOUS/ANXIOUS: 0
HEADACHES: 0
HEMATOCHEZIA: 0
FREQUENCY: 0
EYE PAIN: 0
SHORTNESS OF BREATH: 0
PARESTHESIAS: 0
HEARTBURN: 0
HEMATURIA: 0
SORE THROAT: 0
CONSTIPATION: 0
DYSURIA: 0
MYALGIAS: 0
DIZZINESS: 0
CHILLS: 0
DIARRHEA: 0
COUGH: 0
JOINT SWELLING: 0
PALPITATIONS: 0

## 2021-09-27 NOTE — PATIENT INSTRUCTIONS
Preventive Health Recommendations  Female Ages 40 to 49    Yearly exam:     See your health care provider every year in order to  1. Review health changes.   2. Discuss preventive care.    3. Review your medicines if your doctor prescribed any.      Get a Pap test every three years (unless you have an abnormal result and your provider advises testing more often).      If you get Pap tests with HPV test, you only need to test every 5 years, unless you have an abnormal result. You do not need a Pap test if your uterus was removed (hysterectomy) and you have not had cancer.      You should be tested each year for STDs (sexually transmitted diseases), if you're at risk.     Ask your doctor if you should have a mammogram.      Have a colonoscopy (test for colon cancer) if someone in your family has had colon cancer or polyps before age 50.       Have a cholesterol test every 5 years.       Have a diabetes test (fasting glucose) after age 45. If you are at risk for diabetes, you should have this test every 3 years.    Shots: Get a flu shot each year. Get a tetanus shot every 10 years.     Nutrition:     Eat at least 5 servings of fruits and vegetables each day.    Eat whole-grain bread, whole-wheat pasta and brown rice instead of white grains and rice.    Get adequate Calcium and Vitamin D.      Lifestyle    Exercise at least 150 minutes a week (an average of 30 minutes a day, 5 days a week). This will help you control your weight and prevent disease.    Limit alcohol to one drink per day.    No smoking.     Wear sunscreen to prevent skin cancer.    See your dentist every six months for an exam and cleaning.  PLEASE CALL TO SCHEDULE YOUR MAMMOGRAM  [facility] [phone]  [facility] [place] [phone]  [facility]   [phone]  Central Scheduling (all locations) [phone]    If you are under/uninsured, we recommend you contact the [facility]. They offer mammograms on a  sliding fee charge. You can schedule with them at [phone].

## 2021-09-27 NOTE — PROGRESS NOTES
SUBJECTIVE:   CC: Tracy Dempsey is an 40 year old woman who presents for preventive health visit.     Patient has been advised of split billing requirements and indicates understanding: Yes  Healthy Habits:     Getting at least 3 servings of Calcium per day:  Yes    Bi-annual eye exam:  NO    Dental care twice a year:  NO    Sleep apnea or symptoms of sleep apnea:  None    Diet:  Regular (no restrictions)    Frequency of exercise:  4-5 days/week    Duration of exercise:  15-30 minutes    Taking medications regularly:  No    Medication side effects:  Not applicable    PHQ-2 Total Score: 0    Additional concerns today:  Yes    1) had a baby 18 months ago, baby girl Tracy , they would like to start trying for  2nd baby , have not yet but would like to do her PE and PAP today   2) there is some FH of breast cancer in her MGM,  not a first degree relative and wondering about mammograms     Today's PHQ-2 Score:   PHQ-2 ( 1999 Pfizer) 9/25/2021   Q1: Little interest or pleasure in doing things 0   Q2: Feeling down, depressed or hopeless 0   PHQ-2 Score 0   Q1: Little interest or pleasure in doing things Not at all   Q2: Feeling down, depressed or hopeless Not at all   PHQ-2 Score 0       Abuse: Current or Past (Physical, Sexual or Emotional) - No  Do you feel safe in your environment? Yes    Have you ever done Advance Care Planning? (For example, a Health Directive, POLST, or a discussion with a medical provider or your loved ones about your wishes): Yes, patient states has an Advance Care Planning document and will bring a copy to the clinic.    Social History     Tobacco Use     Smoking status: Never Smoker     Smokeless tobacco: Never Used   Substance Use Topics     Alcohol use: Yes     Alcohol/week: 0.0 standard drinks     Comment: 2 drinks daily     If you drink alcohol do you typically have >3 drinks per day or >7 drinks per week? No    Alcohol Use 9/25/2021   Prescreen: >3 drinks/day or >7 drinks/week? No    Prescreen: >3 drinks/day or >7 drinks/week? -   No flowsheet data found.    Reviewed orders with patient.  Reviewed health maintenance and updated orders accordingly - Yes  Lab work is in process  Labs reviewed in EPIC  BP Readings from Last 3 Encounters:   09/28/21 107/68   08/07/19 98/68   07/02/19 112/73    Wt Readings from Last 3 Encounters:   09/28/21 59.4 kg (131 lb)   08/07/19 57.1 kg (125 lb 12.8 oz)   07/02/19 57.2 kg (126 lb)                  Patient Active Problem List   Diagnosis     CARDIOVASCULAR SCREENING; LDL GOAL LESS THAN 160     Cervicalgia     HSV (herpes simplex virus) infection     Past Surgical History:   Procedure Laterality Date     BIOPSY  2009    Moles biopsied on back       Social History     Tobacco Use     Smoking status: Never Smoker     Smokeless tobacco: Never Used   Substance Use Topics     Alcohol use: Yes     Alcohol/week: 0.0 standard drinks     Comment: 2 drinks daily     Family History   Problem Relation Age of Onset     Hypertension Father      Alcohol/Drug Father      Anxiety Disorder Father      Cancer Maternal Grandmother         breast     Breast Cancer Maternal Grandmother      Neurologic Disorder Paternal Grandmother         als     Genetic Disorder Paternal Grandmother         ALS     Hypertension Paternal Grandfather      Cerebrovascular Disease Paternal Grandfather      Neurologic Disorder Sister         MS may be     Thyroid Disease Sister      Thyroid Disease Sister          Current Outpatient Medications   Medication Sig Dispense Refill     triamcinolone (KENALOG) 0.1 % external cream Use twice a day as needed for up to two weeks 60 g 1     acyclovir (ZOVIRAX) 400 MG tablet TAKE 1 TABLET(400 MG) BY MOUTH THREE TIMES DAILY 30 tablet 0     ferrous sulfate (FEROSUL) 325 (65 Fe) MG tablet Take 325 mg by mouth       PRENATAL VIT-FE BIS-FE PROT-FA PO        Allergies   Allergen Reactions     Nickel      Recent Labs   Lab Test 01/27/17  0916 05/22/15  0859   LDL 68 77    HDL 75 80   TRIG 50 68   TSH 1.94  --         Breast Cancer Screening:    FHS-7:   Breast CA Risk Assessment (FHS-7) 9/25/2021   Did any of your first-degree relatives have breast or ovarian cancer? No   Did any of your relatives have bilateral breast cancer? Unknown   Did any man in your family have breast cancer? No   Did any woman in your family have breast and ovarian cancer? Unknown   Did any woman in your family have breast cancer before age 50 y? No   Do you have 2 or more relatives with breast and/or ovarian cancer? Unknown   Do you have 2 or more relatives with breast and/or bowel cancer? Unknown       Mammogram Screening - Offered annual screening and updated Health Maintenance for [place] plan based on risk factor consideration    Pertinent mammograms are reviewed under the imaging tab.    History of abnormal Pap smear: NO - age 30- 65 PAP every 3 years recommended  PAP / HPV Latest Ref Rng & Units 1/30/2018 5/22/2015 7/27/2012   PAP (Historical) - NIL NIL NIL   HPV16 NEG:Negative Negative Negative -   HPV18 NEG:Negative Negative Negative -   HRHPV NEG:Negative Negative Negative -     Reviewed and updated as needed this visit by clinical staff                 Reviewed and updated as needed this visit by Provider                History reviewed. No pertinent past medical history.   Past Surgical History:   Procedure Laterality Date     BIOPSY  2009    Moles biopsied on back       Review of Systems   Constitutional: Negative for chills and fever.   HENT: Negative for congestion, ear pain, hearing loss and sore throat.    Eyes: Negative for pain and visual disturbance.   Respiratory: Negative for cough and shortness of breath.    Cardiovascular: Negative for chest pain, palpitations and peripheral edema.   Gastrointestinal: Negative for abdominal pain, constipation, diarrhea, heartburn, hematochezia and nausea.   Breasts:  Negative for tenderness, breast mass and discharge.   Genitourinary: Negative for  dysuria, frequency, genital sores, hematuria, pelvic pain, urgency, vaginal bleeding and vaginal discharge.   Musculoskeletal: Negative for arthralgias, joint swelling and myalgias.   Skin: Negative for rash.   Neurological: Negative for dizziness, weakness, headaches and paresthesias.   Psychiatric/Behavioral: Negative for mood changes. The patient is not nervous/anxious.           OBJECTIVE:   There were no vitals taken for this visit.  Physical Exam  GENERAL: healthy, alert and no distress  EYES: Eyes grossly normal to inspection, PERRL and conjunctivae and sclerae normal  HENT: ear canals and TM's normal, nose and mouth without ulcers or lesions  NECK: no adenopathy, no asymmetry, masses, or scars and thyroid normal to palpation  RESP: lungs clear to auscultation - no rales, rhonchi or wheezes  BREAST: normal without masses, tenderness or nipple discharge and no palpable axillary masses or adenopathy  CV: regular rate and rhythm, normal S1 S2, no S3 or S4, no murmur, click or rub, no peripheral edema and peripheral pulses strong  ABDOMEN: soft, nontender, no hepatosplenomegaly, no masses and bowel sounds normal   (female): normal female external genitalia, normal urethral meatus, vaginal mucosa pink, moist, well rugated, and normal cervix/adnexa/uterus without masses or discharge  MS: no gross musculoskeletal defects noted, no edema  SKIN: no suspicious lesions or rashes  NEURO: Normal strength and tone, mentation intact and speech normal  PSYCH: mentation appears normal, affect normal/bright    Diagnostic Test Results:  Labs reviewed in Epic  No results found for this or any previous visit (from the past 24 hour(s)).    ASSESSMENT/PLAN:   (Z00.00) Routine general medical examination at a health care facility  (primary encounter diagnosis)  Discussed diet,calcium,exercise.Went over self breast exam.Thin prep was done.Eyes and teeth UTD.No immunizations needed today.See orders below for tests ordered and  "screening needed.    Plan: PAP screen with HPV - recommended age 30 - 65         years        Will be informed about the results   We discussed mammograms, she will call the breast center to schedule and ask about screening as she is still breast feeding her 8 month old baby girl, also discussed PNV , folic acid , Vit D, iron etc     (R21) Rash  Comment: she uses only as needed for dermatitis spot on face and not daily   Plan: triamcinolone (KENALOG) 0.1 % external cream        If used , can use twice a day for not longer than 2 weeks       Patient has been advised of split billing requirements and indicates understanding: Yes  COUNSELING:  Reviewed preventive health counseling, as reflected in patient instructions       Regular exercise       Healthy diet/nutrition       Vision screening       Contraception       Family planning    Estimated body mass index is 19.7 kg/m  as calculated from the following:    Height as of 8/7/19: 1.702 m (5' 7\").    Weight as of 8/7/19: 57.1 kg (125 lb 12.8 oz).        She reports that she has never smoked. She has never used smokeless tobacco.      Counseling Resources:  ATP IV Guidelines  Pooled Cohorts Equation Calculator  Breast Cancer Risk Calculator  BRCA-Related Cancer Risk Assessment: FHS-7 Tool  FRAX Risk Assessment  ICSI Preventive Guidelines  Dietary Guidelines for Americans, 2010  [employer]'s MyPlate  ASA Prophylaxis  Lung CA Screening    Jacque Marina MD  [facility] [place]  "

## 2021-09-28 ENCOUNTER — OFFICE VISIT (OUTPATIENT)
Dept: FAMILY MEDICINE | Facility: CLINIC | Age: 41
End: 2021-09-28
Payer: COMMERCIAL

## 2021-09-28 VITALS
HEART RATE: 73 BPM | OXYGEN SATURATION: 98 % | WEIGHT: 131 LBS | BODY MASS INDEX: 20.56 KG/M2 | HEIGHT: 67 IN | TEMPERATURE: 98.2 F | SYSTOLIC BLOOD PRESSURE: 107 MMHG | DIASTOLIC BLOOD PRESSURE: 68 MMHG

## 2021-09-28 DIAGNOSIS — Z00.00 ROUTINE GENERAL MEDICAL EXAMINATION AT A HEALTH CARE FACILITY: Primary | ICD-10-CM

## 2021-09-28 DIAGNOSIS — R21 RASH: ICD-10-CM

## 2021-09-28 PROCEDURE — G0145 SCR C/V CYTO,THINLAYER,RESCR: HCPCS | Performed by: FAMILY MEDICINE

## 2021-09-28 PROCEDURE — 87624 HPV HI-RISK TYP POOLED RSLT: CPT | Performed by: FAMILY MEDICINE

## 2021-09-28 PROCEDURE — 99396 PREV VISIT EST AGE 40-64: CPT | Performed by: FAMILY MEDICINE

## 2021-09-28 PROCEDURE — 99213 OFFICE O/P EST LOW 20 MIN: CPT | Mod: 25 | Performed by: FAMILY MEDICINE

## 2021-09-28 RX ORDER — FERROUS SULFATE 325(65) MG
325 TABLET ORAL
COMMUNITY

## 2021-09-28 RX ORDER — TRIAMCINOLONE ACETONIDE 1 MG/G
CREAM TOPICAL
Qty: 60 G | Refills: 1 | Status: SHIPPED | OUTPATIENT
Start: 2021-09-28

## 2021-09-28 ASSESSMENT — ENCOUNTER SYMPTOMS
ABDOMINAL PAIN: 0
NAUSEA: 0
HEADACHES: 0
HEMATURIA: 0
EYE PAIN: 0
CONSTIPATION: 0
MYALGIAS: 0
BREAST MASS: 0
ARTHRALGIAS: 0
CHILLS: 0
HEARTBURN: 0
SHORTNESS OF BREATH: 0
PARESTHESIAS: 0
COUGH: 0
FREQUENCY: 0
FEVER: 0
DIZZINESS: 0
DYSURIA: 0
JOINT SWELLING: 0
NERVOUS/ANXIOUS: 0
PALPITATIONS: 0
HEMATOCHEZIA: 0
DIARRHEA: 0
WEAKNESS: 0
SORE THROAT: 0

## 2021-09-28 ASSESSMENT — MIFFLIN-ST. JEOR: SCORE: 1296.84

## 2021-09-30 LAB
BKR LAB AP GYN ADEQUACY: NORMAL
BKR LAB AP GYN INTERPRETATION: NORMAL
BKR LAB AP HPV REFLEX: NORMAL
BKR LAB AP PREVIOUS ABNORMAL: NORMAL
PATH REPORT.COMMENTS IMP SPEC: NORMAL
PATH REPORT.RELEVANT HX SPEC: NORMAL

## 2021-10-04 LAB
HUMAN PAPILLOMA VIRUS 16 DNA: NEGATIVE
HUMAN PAPILLOMA VIRUS 18 DNA: NEGATIVE
HUMAN PAPILLOMA VIRUS FINAL DIAGNOSIS: NORMAL
HUMAN PAPILLOMA VIRUS OTHER HR: NEGATIVE

## 2021-10-08 ENCOUNTER — ANCILLARY PROCEDURE (OUTPATIENT)
Dept: MAMMOGRAPHY | Facility: CLINIC | Age: 41
End: 2021-10-08
Attending: FAMILY MEDICINE
Payer: COMMERCIAL

## 2021-10-08 DIAGNOSIS — Z12.31 VISIT FOR SCREENING MAMMOGRAM: ICD-10-CM

## 2021-10-08 PROCEDURE — 77067 SCR MAMMO BI INCL CAD: CPT

## 2021-12-03 ENCOUNTER — MYC REFILL (OUTPATIENT)
Dept: FAMILY MEDICINE | Facility: CLINIC | Age: 41
End: 2021-12-03
Payer: COMMERCIAL

## 2021-12-03 DIAGNOSIS — B00.9 HSV (HERPES SIMPLEX VIRUS) INFECTION: ICD-10-CM

## 2021-12-06 RX ORDER — ACYCLOVIR 400 MG/1
TABLET ORAL
Qty: 30 TABLET | Refills: 0 | Status: SHIPPED | OUTPATIENT
Start: 2021-12-06 | End: 2023-12-27

## 2021-12-06 NOTE — TELEPHONE ENCOUNTER
LS,    Routing refill request to provider for review/approval because:  Labs not current:  No results found for: CR    Last OV 9/28/21  Thanks,  Kelsea Ryan RN

## 2022-12-09 ENCOUNTER — ANCILLARY PROCEDURE (OUTPATIENT)
Dept: MAMMOGRAPHY | Facility: CLINIC | Age: 42
End: 2022-12-09
Attending: FAMILY MEDICINE
Payer: COMMERCIAL

## 2022-12-09 DIAGNOSIS — Z12.31 VISIT FOR SCREENING MAMMOGRAM: ICD-10-CM

## 2022-12-09 PROCEDURE — 77067 SCR MAMMO BI INCL CAD: CPT

## 2023-01-30 ASSESSMENT — ENCOUNTER SYMPTOMS
BREAST MASS: 0
CHILLS: 0
MYALGIAS: 0
WEAKNESS: 0
FEVER: 0
NAUSEA: 0
ABDOMINAL PAIN: 0
JOINT SWELLING: 0
ARTHRALGIAS: 0
PALPITATIONS: 0
HEMATOCHEZIA: 0
HEADACHES: 0
DIZZINESS: 0
PARESTHESIAS: 0
EYE PAIN: 0
HEARTBURN: 0
COUGH: 0
CONSTIPATION: 0
NERVOUS/ANXIOUS: 0
DYSURIA: 0
FREQUENCY: 0
HEMATURIA: 0
SHORTNESS OF BREATH: 0
DIARRHEA: 0
SORE THROAT: 0

## 2023-01-30 ASSESSMENT — ANXIETY QUESTIONNAIRES
GAD7 TOTAL SCORE: 0
4. TROUBLE RELAXING: NOT AT ALL
GAD7 TOTAL SCORE: 0
3. WORRYING TOO MUCH ABOUT DIFFERENT THINGS: NOT AT ALL
6. BECOMING EASILY ANNOYED OR IRRITABLE: NOT AT ALL
2. NOT BEING ABLE TO STOP OR CONTROL WORRYING: NOT AT ALL
7. FEELING AFRAID AS IF SOMETHING AWFUL MIGHT HAPPEN: NOT AT ALL
7. FEELING AFRAID AS IF SOMETHING AWFUL MIGHT HAPPEN: NOT AT ALL
5. BEING SO RESTLESS THAT IT IS HARD TO SIT STILL: NOT AT ALL
GAD7 TOTAL SCORE: 0
1. FEELING NERVOUS, ANXIOUS, OR ON EDGE: NOT AT ALL

## 2023-01-30 ASSESSMENT — PATIENT HEALTH QUESTIONNAIRE - PHQ9
10. IF YOU CHECKED OFF ANY PROBLEMS, HOW DIFFICULT HAVE THESE PROBLEMS MADE IT FOR YOU TO DO YOUR WORK, TAKE CARE OF THINGS AT HOME, OR GET ALONG WITH OTHER PEOPLE: NOT DIFFICULT AT ALL
SUM OF ALL RESPONSES TO PHQ QUESTIONS 1-9: 1
SUM OF ALL RESPONSES TO PHQ QUESTIONS 1-9: 1

## 2023-01-31 ENCOUNTER — OFFICE VISIT (OUTPATIENT)
Dept: FAMILY MEDICINE | Facility: CLINIC | Age: 43
End: 2023-01-31
Payer: COMMERCIAL

## 2023-01-31 VITALS
OXYGEN SATURATION: 99 % | HEIGHT: 67 IN | TEMPERATURE: 97.5 F | DIASTOLIC BLOOD PRESSURE: 82 MMHG | SYSTOLIC BLOOD PRESSURE: 130 MMHG | HEART RATE: 64 BPM | WEIGHT: 138 LBS | BODY MASS INDEX: 21.66 KG/M2

## 2023-01-31 DIAGNOSIS — Z00.00 ROUTINE GENERAL MEDICAL EXAMINATION AT A HEALTH CARE FACILITY: ICD-10-CM

## 2023-01-31 LAB
ALBUMIN SERPL BCG-MCNC: 4.6 G/DL (ref 3.5–5.2)
ALP SERPL-CCNC: 70 U/L (ref 35–104)
ALT SERPL W P-5'-P-CCNC: 14 U/L (ref 10–35)
ANION GAP SERPL CALCULATED.3IONS-SCNC: 10 MMOL/L (ref 7–15)
AST SERPL W P-5'-P-CCNC: 26 U/L (ref 10–35)
BILIRUB SERPL-MCNC: 0.7 MG/DL
BUN SERPL-MCNC: 8 MG/DL (ref 6–20)
CALCIUM SERPL-MCNC: 9.4 MG/DL (ref 8.6–10)
CHLORIDE SERPL-SCNC: 105 MMOL/L (ref 98–107)
CHOLEST SERPL-MCNC: 212 MG/DL
CREAT SERPL-MCNC: 0.57 MG/DL (ref 0.51–0.95)
DEPRECATED HCO3 PLAS-SCNC: 24 MMOL/L (ref 22–29)
GFR SERPL CREATININE-BSD FRML MDRD: >90 ML/MIN/1.73M2
GLUCOSE SERPL-MCNC: 84 MG/DL (ref 70–99)
HDLC SERPL-MCNC: 58 MG/DL
LDLC SERPL CALC-MCNC: 140 MG/DL
NONHDLC SERPL-MCNC: 154 MG/DL
POTASSIUM SERPL-SCNC: 4.4 MMOL/L (ref 3.4–5.3)
PROT SERPL-MCNC: 7.1 G/DL (ref 6.4–8.3)
SODIUM SERPL-SCNC: 139 MMOL/L (ref 136–145)
TRIGL SERPL-MCNC: 72 MG/DL
TSH SERPL DL<=0.005 MIU/L-ACNC: 1.75 UIU/ML (ref 0.3–4.2)

## 2023-01-31 PROCEDURE — 99396 PREV VISIT EST AGE 40-64: CPT | Performed by: FAMILY MEDICINE

## 2023-01-31 PROCEDURE — 80061 LIPID PANEL: CPT | Performed by: FAMILY MEDICINE

## 2023-01-31 PROCEDURE — 36415 COLL VENOUS BLD VENIPUNCTURE: CPT | Performed by: FAMILY MEDICINE

## 2023-01-31 PROCEDURE — 80053 COMPREHEN METABOLIC PANEL: CPT | Performed by: FAMILY MEDICINE

## 2023-01-31 PROCEDURE — 84443 ASSAY THYROID STIM HORMONE: CPT | Performed by: FAMILY MEDICINE

## 2023-01-31 ASSESSMENT — ENCOUNTER SYMPTOMS
HEMATOCHEZIA: 0
JOINT SWELLING: 0
HEARTBURN: 0
ABDOMINAL PAIN: 0
DYSURIA: 0
SORE THROAT: 0
EYE PAIN: 0
HEADACHES: 0
FREQUENCY: 0
PARESTHESIAS: 0
MYALGIAS: 0
CONSTIPATION: 0
SHORTNESS OF BREATH: 0
DIARRHEA: 0
NERVOUS/ANXIOUS: 0
DIZZINESS: 0
NAUSEA: 0
ARTHRALGIAS: 0
COUGH: 0
WEAKNESS: 0
HEMATURIA: 0
CHILLS: 0
BREAST MASS: 0
PALPITATIONS: 0
FEVER: 0

## 2023-01-31 ASSESSMENT — PAIN SCALES - GENERAL: PAINLEVEL: NO PAIN (0)

## 2023-01-31 NOTE — PROGRESS NOTES
SUBJECTIVE:   CC: Tracy is an 42 year old who presents for preventive health visit.   Patient has been advised of split billing requirements and indicates understanding: Yes  Healthy Habits:     Getting at least 3 servings of Calcium per day:  Yes    Bi-annual eye exam:  Yes    Dental care twice a year:  Yes    Sleep apnea or symptoms of sleep apnea:  None    Diet:  Vegetarian/vegan    Frequency of exercise:  None    Taking medications regularly:  Yes    Medication side effects:  None    PHQ-2 Total Score: 0    Additional concerns today:  No          She and her  have a 2 and half year old girl and trying to get pregnant again , she has had two miscarriages last year in 2022   Sees an OBGYN for this , thinking about IVF vs still trying to get pregnant naturally     Today's PHQ-2 Score:   PHQ-2 ( 1999 Pfizer) 1/30/2023   Q1: Little interest or pleasure in doing things 0   Q2: Feeling down, depressed or hopeless 0   PHQ-2 Score 0   PHQ-2 Total Score (12-17 Years)- Positive if 3 or more points; Administer PHQ-A if positive -   Q1: Little interest or pleasure in doing things Not at all   Q2: Feeling down, depressed or hopeless Not at all   PHQ-2 Score 0           Social History     Tobacco Use     Smoking status: Never     Smokeless tobacco: Never   Substance Use Topics     Alcohol use: Yes     Alcohol/week: 0.0 standard drinks     Comment: 2 drinks daily         Alcohol Use 1/30/2023   Prescreen: >3 drinks/day or >7 drinks/week? No   Prescreen: >3 drinks/day or >7 drinks/week? -       Reviewed orders with patient.  Reviewed health maintenance and updated orders accordingly - Yes  Lab work is in process  Labs reviewed in EPIC  BP Readings from Last 3 Encounters:   01/31/23 130/82   09/28/21 107/68   08/07/19 98/68    Wt Readings from Last 3 Encounters:   01/31/23 62.6 kg (138 lb)   09/28/21 59.4 kg (131 lb)   08/07/19 57.1 kg (125 lb 12.8 oz)                  Patient Active Problem List   Diagnosis      CARDIOVASCULAR SCREENING; LDL GOAL LESS THAN 160     Cervicalgia     HSV (herpes simplex virus) infection     Past Surgical History:   Procedure Laterality Date     BIOPSY  2009    Moles biopsied on back       Social History     Tobacco Use     Smoking status: Never     Smokeless tobacco: Never   Substance Use Topics     Alcohol use: Yes     Alcohol/week: 0.0 standard drinks     Comment: 2 drinks daily     Family History   Problem Relation Age of Onset     Hypertension Father      Alcohol/Drug Father      Anxiety Disorder Father      Cancer Maternal Grandmother         breast     Breast Cancer Maternal Grandmother      Neurologic Disorder Paternal Grandmother         als     Genetic Disorder Paternal Grandmother         ALS     Hypertension Paternal Grandfather      Cerebrovascular Disease Paternal Grandfather      Neurologic Disorder Sister         MS may be     Thyroid Disease Sister      Thyroid Disease Sister          Current Outpatient Medications   Medication Sig Dispense Refill     acyclovir (ZOVIRAX) 400 MG tablet One tablet by mouth 3 times daily as needed for outbreak 30 tablet 0     ferrous sulfate (FEROSUL) 325 (65 Fe) MG tablet Take 325 mg by mouth       PRENATAL VIT-FE BIS-FE PROT-FA PO        triamcinolone (KENALOG) 0.1 % external cream Use twice a day as needed for up to two weeks 60 g 1     Allergies   Allergen Reactions     Nickel      Recent Labs   Lab Test 01/27/17  0916 05/22/15  0859   LDL 68 77   HDL 75 80   TRIG 50 68   TSH 1.94  --         Breast Cancer Screening:    FHS-7:   Breast CA Risk Assessment (FHS-7) 9/25/2021 9/28/2021 10/8/2021 12/8/2022 1/30/2023   Did any of your first-degree relatives have breast or ovarian cancer? No No No No No   Did any of your relatives have bilateral breast cancer? Unknown Unknown No Unknown Unknown   Did any man in your family have breast cancer? No No No No No   Did any woman in your family have breast and ovarian cancer? Unknown Unknown No Unknown No    Did any woman in your family have breast cancer before age 50 y? No No No No No   Do you have 2 or more relatives with breast and/or ovarian cancer? Unknown Unknown No No No   Do you have 2 or more relatives with breast and/or bowel cancer? Unknown Unknown No No No       Mammogram Screening - Offered annual screening and updated Health Maintenance for mutual plan based on risk factor consideration    Pertinent mammograms are reviewed under the imaging tab.    History of abnormal Pap smear: NO - age 30- 65 PAP every 3 years recommended  PAP / HPV Latest Ref Rng & Units 9/28/2021 1/30/2018 5/22/2015   PAP   Negative for Intraepithelial Lesion or Malignancy (NILM) - -   PAP (Historical) - - NIL NIL   HPV16 Negative Negative Negative Negative   HPV18 Negative Negative Negative Negative   HRHPV Negative Negative Negative Negative     Reviewed and updated as needed this visit by clinical staff                  Reviewed and updated as needed this visit by Provider                 No past medical history on file.   Past Surgical History:   Procedure Laterality Date     BIOPSY  2009    Moles biopsied on back       Review of Systems   Constitutional: Negative for chills and fever.   HENT: Negative for congestion, ear pain, hearing loss and sore throat.    Eyes: Negative for pain and visual disturbance.   Respiratory: Negative for cough and shortness of breath.    Cardiovascular: Negative for chest pain, palpitations and peripheral edema.   Gastrointestinal: Negative for abdominal pain, constipation, diarrhea, heartburn, hematochezia and nausea.   Breasts:  Negative for tenderness, breast mass and discharge.   Genitourinary: Negative for dysuria, frequency, genital sores, hematuria, pelvic pain, urgency, vaginal bleeding and vaginal discharge.   Musculoskeletal: Negative for arthralgias, joint swelling and myalgias.   Skin: Negative for rash.   Neurological: Negative for dizziness, weakness, headaches and paresthesias.    Psychiatric/Behavioral: Negative for mood changes. The patient is not nervous/anxious.      CONSTITUTIONAL: NEGATIVE for fever, chills, change in weight  INTEGUMENTARU/SKIN: NEGATIVE for worrisome rashes, moles or lesions  EYES: NEGATIVE for vision changes or irritation  ENT: NEGATIVE for ear, mouth and throat problems  RESP: NEGATIVE for significant cough or SOB  BREAST: NEGATIVE for masses, tenderness or discharge  CV: NEGATIVE for chest pain, palpitations or peripheral edema  GI: NEGATIVE for nausea, abdominal pain, heartburn, or change in bowel habits  : NEGATIVE for unusual urinary or vaginal symptoms. Periods are regular.  MUSCULOSKELETAL: NEGATIVE for significant arthralgias or myalgia  NEURO: NEGATIVE for weakness, dizziness or paresthesias  PSYCHIATRIC: NEGATIVE for changes in mood or affect     OBJECTIVE:   There were no vitals taken for this visit.  Physical Exam  GENERAL: healthy, alert and no distress  EYES: Eyes grossly normal to inspection, PERRL and conjunctivae and sclerae normal  HENT: ear canals and TM's normal, nose and mouth without ulcers or lesions  NECK: no adenopathy, no asymmetry, masses, or scars and thyroid normal to palpation  RESP: lungs clear to auscultation - no rales, rhonchi or wheezes  BREAST: normal without masses, tenderness or nipple discharge and no palpable axillary masses or adenopathy  CV: regular rate and rhythm, normal S1 S2, no S3 or S4, no murmur, click or rub, no peripheral edema and peripheral pulses strong  ABDOMEN: soft, nontender, no hepatosplenomegaly, no masses and bowel sounds normal  MS: no gross musculoskeletal defects noted, no edema  SKIN: no suspicious lesions or rashes  NEURO: Normal strength and tone, mentation intact and speech normal  PSYCH: mentation appears normal, affect normal/bright    Diagnostic Test Results:  Labs reviewed in Epic  No results found for this or any previous visit (from the past 24 hour(s)).    ASSESSMENT/PLAN:   (Z00.00)  Routine general medical examination at a health care facility  Comment: Discussed diet,calcium,exercise.Went over self breast exam.Thin prep was NOT done.Eyes and teeth UTD.No immunizations needed today.See orders below for tests ordered and screening needed.    Plan: REVIEW OF HEALTH MAINTENANCE PROTOCOL ORDERS,         TSH with free T4 reflex, Lipid panel reflex to         direct LDL Fasting, Comprehensive metabolic         panel (BMP + Alb, Alk Phos, ALT, AST, Total.         Bili, TP)              Patient has been advised of split billing requirements and indicates understanding: Yes      COUNSELING:  Reviewed preventive health counseling, as reflected in patient instructions       Regular exercise       Healthy diet/nutrition       Vision screening       Hearing screening        She reports that she has never smoked. She has never used smokeless tobacco.      Jacque Marina MD  [facility]  Answers for HPI/ROS submitted by the patient on 1/30/2023  If you checked off any problems, how difficult have these problems made it for you to do your work, take care of things at home, or get along with other people?: Not difficult at all  PHQ9 TOTAL SCORE: 1  MATHEW 7 TOTAL SCORE: 0

## 2023-12-27 ENCOUNTER — MYC REFILL (OUTPATIENT)
Dept: FAMILY MEDICINE | Facility: CLINIC | Age: 43
End: 2023-12-27
Payer: COMMERCIAL

## 2023-12-27 DIAGNOSIS — B00.9 HSV (HERPES SIMPLEX VIRUS) INFECTION: ICD-10-CM

## 2023-12-28 RX ORDER — ACYCLOVIR 400 MG/1
TABLET ORAL
Qty: 30 TABLET | Refills: 0 | Status: SHIPPED | OUTPATIENT
Start: 2023-12-28

## 2024-01-28 ASSESSMENT — ENCOUNTER SYMPTOMS
BREAST MASS: 0
FREQUENCY: 0
HEMATURIA: 0
DIZZINESS: 0
HEARTBURN: 0
NAUSEA: 0
PARESTHESIAS: 0
DYSURIA: 0
PALPITATIONS: 0
COUGH: 0
ARTHRALGIAS: 0
FEVER: 0
NERVOUS/ANXIOUS: 0
CONSTIPATION: 0
HEMATOCHEZIA: 0
EYE PAIN: 0
MYALGIAS: 0
WEAKNESS: 0
CHILLS: 0
ABDOMINAL PAIN: 0
SHORTNESS OF BREATH: 0
HEADACHES: 0
DIARRHEA: 0
SORE THROAT: 0
JOINT SWELLING: 0

## 2024-01-31 ENCOUNTER — OFFICE VISIT (OUTPATIENT)
Dept: FAMILY MEDICINE | Facility: CLINIC | Age: 44
End: 2024-01-31
Payer: COMMERCIAL

## 2024-01-31 VITALS
WEIGHT: 163.6 LBS | SYSTOLIC BLOOD PRESSURE: 121 MMHG | OXYGEN SATURATION: 98 % | HEIGHT: 67 IN | BODY MASS INDEX: 25.68 KG/M2 | TEMPERATURE: 99.1 F | RESPIRATION RATE: 18 BRPM | DIASTOLIC BLOOD PRESSURE: 81 MMHG | HEART RATE: 107 BPM

## 2024-01-31 DIAGNOSIS — Z00.00 ENCOUNTER FOR ROUTINE ADULT HEALTH EXAMINATION WITHOUT ABNORMAL FINDINGS: Primary | ICD-10-CM

## 2024-01-31 PROCEDURE — 99396 PREV VISIT EST AGE 40-64: CPT | Performed by: FAMILY MEDICINE

## 2024-01-31 RX ORDER — ASPIRIN 81 MG/1
1 TABLET ORAL DAILY
COMMUNITY

## 2024-01-31 ASSESSMENT — ENCOUNTER SYMPTOMS
PALPITATIONS: 0
CONSTIPATION: 0
FREQUENCY: 0
HEADACHES: 0
EYE PAIN: 0
HEMATURIA: 0
NERVOUS/ANXIOUS: 0
NAUSEA: 0
SHORTNESS OF BREATH: 0
COUGH: 0
FEVER: 0
DIARRHEA: 0
DIZZINESS: 0
CHILLS: 0
SORE THROAT: 0
DYSURIA: 0
JOINT SWELLING: 0
MYALGIAS: 0
ABDOMINAL PAIN: 0
WEAKNESS: 0
ARTHRALGIAS: 0

## 2024-01-31 ASSESSMENT — ANXIETY QUESTIONNAIRES
1. FEELING NERVOUS, ANXIOUS, OR ON EDGE: NOT AT ALL
GAD7 TOTAL SCORE: 0
IF YOU CHECKED OFF ANY PROBLEMS ON THIS QUESTIONNAIRE, HOW DIFFICULT HAVE THESE PROBLEMS MADE IT FOR YOU TO DO YOUR WORK, TAKE CARE OF THINGS AT HOME, OR GET ALONG WITH OTHER PEOPLE: NOT DIFFICULT AT ALL
7. FEELING AFRAID AS IF SOMETHING AWFUL MIGHT HAPPEN: NOT AT ALL
2. NOT BEING ABLE TO STOP OR CONTROL WORRYING: NOT AT ALL
6. BECOMING EASILY ANNOYED OR IRRITABLE: NOT AT ALL
4. TROUBLE RELAXING: NOT AT ALL
GAD7 TOTAL SCORE: 0
5. BEING SO RESTLESS THAT IT IS HARD TO SIT STILL: NOT AT ALL
8. IF YOU CHECKED OFF ANY PROBLEMS, HOW DIFFICULT HAVE THESE MADE IT FOR YOU TO DO YOUR WORK, TAKE CARE OF THINGS AT HOME, OR GET ALONG WITH OTHER PEOPLE?: NOT DIFFICULT AT ALL
3. WORRYING TOO MUCH ABOUT DIFFERENT THINGS: NOT AT ALL
7. FEELING AFRAID AS IF SOMETHING AWFUL MIGHT HAPPEN: NOT AT ALL
GAD7 TOTAL SCORE: 0

## 2024-01-31 ASSESSMENT — PATIENT HEALTH QUESTIONNAIRE - PHQ9
10. IF YOU CHECKED OFF ANY PROBLEMS, HOW DIFFICULT HAVE THESE PROBLEMS MADE IT FOR YOU TO DO YOUR WORK, TAKE CARE OF THINGS AT HOME, OR GET ALONG WITH OTHER PEOPLE: NOT DIFFICULT AT ALL
SUM OF ALL RESPONSES TO PHQ QUESTIONS 1-9: 2
SUM OF ALL RESPONSES TO PHQ QUESTIONS 1-9: 2

## 2024-01-31 ASSESSMENT — PAIN SCALES - GENERAL: PAINLEVEL: NO PAIN (0)

## 2024-01-31 NOTE — PROGRESS NOTES
"[facility]  [facility]  Jan 31, 2024    Assessment & Plan     Encounter for routine adult health examination without abnormal findings  Healthy, patient is pregnant ,second pregnancy and  due in mid April. She sees OBGYN through [facility] on a regular basis     Will defer lipids check after the delivery . Also, discussed immunizations, she is interested in getting the HPV vaccine , discussed Hep B vaccine , will check Hep B antibody titers         BMI  Estimated body mass index is 26.01 kg/m  as calculated from the following:    Height as of this encounter: 1.689 m (5' 6.5\").    Weight as of this encounter: 74.2 kg (163 lb 9.6 oz).       Counseling  Appropriate preventive services were discussed with this patient, including applicable screening as appropriate for fall prevention, nutrition, physical activity, Tobacco-use cessation, weight loss and cognition.  Checklist reviewing preventive services available has been given to the patient.  Reviewed patient's diet, addressing concerns and/or questions.     Patient has been advised of split billing requirements and indicates understanding: Yes        Kane Molina is a 43 year old, presenting for the following:  Physical        1/31/2024     9:49 AM   Additional Questions   Roomed by Analy PA        Health Care Directive  Patient does not have a Health Care Directive or Living Will: Discussed advance care planning with patient; information given to patient to review.  Healthy Habits:     Getting at least 3 servings of Calcium per day:  Yes    Bi-annual eye exam:  Yes    Dental care twice a year:  Yes    Sleep apnea or symptoms of sleep apnea:  None    Diet:  Other    Frequency of exercise:  None    Taking medications regularly:  Yes    Medication side effects:  None    Additional concerns today:  No            1/28/2024   Social Factors   Worry food won't last until get money to buy more No "   Food not last or not have enough money for food? No   Do you have housing?  Yes   Are you worried about losing your housing? No   Lack of transportation? No   Unable to get utilities (heat,electricity)? No     Today's PHQ-9 Score:       1/31/2024     9:38 AM   PHQ-9 SCORE   PHQ-9 Total Score MyChart 2 (Minimal depression)   PHQ-9 Total Score 2         1/28/2024   Substance Use   Alcohol more than 3/day or more than 7/wk No     Social History     Tobacco Use    Smoking status: Never    Smokeless tobacco: Never   Vaping Use    Vaping Use: Never used   Substance Use Topics    Alcohol use: Yes     Alcohol/week: 0.0 standard drinks of alcohol     Comment: 2 drinks daily    Drug use: Never           1/28/2024   LAST FHS-7 RESULTS   1st degree relative breast or ovarian cancer No   Any relative bilateral breast cancer Unknown   Any male have breast cancer No   Any woman have breast and ovarian cancer Yes   Any woman with breast cancer before 50yrs No   2 or more relatives with breast and/or ovarian cancer No   2 or more relatives with breast and/or bowel cancer No          History of abnormal Pap smear: NO - age 30- 65 PAP every 3 years recommended        Latest Ref Rng & Units 9/28/2021    11:43 AM 1/30/2018    10:33 AM 1/30/2018     9:20 AM   PAP / HPV   PAP  Negative for Intraepithelial Lesion or Malignancy (NILM)      PAP (Historical)    NIL    HPV 16 DNA Negative Negative  Negative     HPV 18 DNA Negative Negative  Negative     Other HR HPV Negative Negative  Negative       The 10-year ASCVD risk score (Filemon ZUNIGA, et al., 2019) is: 0.7%    Values used to calculate the score:      Age: 43 years      Sex: Female      Is Non- : No      Diabetic: No      Tobacco smoker: No      Systolic Blood Pressure: 130 mmHg      Is BP treated: No      HDL Cholesterol: 58 mg/dL      Total Cholesterol: 212 mg/dL         No data to display                 Reviewed and updated as needed this visit by Provider                     No past medical history on file.  Past Surgical History:   Procedure Laterality Date    BIOPSY      Moles biopsied on back     OB History    Para Term  AB Living   2 1 1 0 0 1   SAB IAB Ectopic Multiple Live Births   0 0 0 0 1      # Outcome Date GA Lbr Alexey/2nd Weight Sex Delivery Anes PTL Lv   2 Current            1 Term     F Vag-Spont  N SHAQUILLE     BP Readings from Last 3 Encounters:   24 121/81   23 130/82   21 107/68    Wt Readings from Last 3 Encounters:   24 74.2 kg (163 lb 9.6 oz)   23 62.6 kg (138 lb)   21 59.4 kg (131 lb)                  Patient Active Problem List   Diagnosis    CARDIOVASCULAR SCREENING; LDL GOAL LESS THAN 160    Cervicalgia    HSV (herpes simplex virus) infection     Past Surgical History:   Procedure Laterality Date    BIOPSY      Moles biopsied on back       Social History     Tobacco Use    Smoking status: Never    Smokeless tobacco: Never   Substance Use Topics    Alcohol use: Not Currently     Comment: 2 drinks daily     Family History   Problem Relation Age of Onset    Hypertension Father     Alcohol/Drug Father     Anxiety Disorder Father     Hyperlipidemia Father     Cancer Maternal Grandmother         breast    Breast Cancer Maternal Grandmother     Neurologic Disorder Paternal Grandmother         als    Genetic Disorder Paternal Grandmother         ALS    Hypertension Paternal Grandfather     Cerebrovascular Disease Paternal Grandfather     Neurologic Disorder Sister         MS may be    Thyroid Disease Sister     Thyroid Disease Sister     Cerebrovascular Disease Cousin         Aged 40 at time of stroke         Current Outpatient Medications   Medication Sig Dispense Refill    acyclovir (ZOVIRAX) 400 MG tablet One tablet by mouth 3 times daily as needed for outbreak 30 tablet 0    aspirin 81 MG EC tablet Take 1 tablet by mouth daily      ferrous sulfate (FEROSUL) 325 (65 Fe) MG tablet Take 325 mg by mouth    "   PRENATAL VIT-FE BIS-FE PROT-FA PO       triamcinolone (KENALOG) 0.1 % external cream Use twice a day as needed for up to two weeks 60 g 1     Allergies   Allergen Reactions    Nickel      Review of Systems   Constitutional:  Negative for chills and fever.   HENT:  Negative for congestion, ear pain, hearing loss and sore throat.    Eyes:  Negative for pain and visual disturbance.   Respiratory:  Negative for cough and shortness of breath.    Cardiovascular:  Negative for chest pain and palpitations.   Gastrointestinal:  Negative for abdominal pain, constipation, diarrhea and nausea.   Genitourinary:  Positive for pelvic pain. Negative for dysuria, frequency, genital sores, hematuria, urgency, vaginal bleeding and vaginal discharge.   Musculoskeletal:  Negative for arthralgias, joint swelling and myalgias.   Skin:  Negative for rash.   Neurological:  Negative for dizziness, weakness and headaches.   Psychiatric/Behavioral:  The patient is not nervous/anxious.        Review of Systems  Constitutional, HEENT, cardiovascular, pulmonary, GI, , musculoskeletal, neuro, skin, endocrine and psych systems are negative, except as otherwise noted.     Objective    Exam  There were no vitals taken for this visit.   Estimated body mass index is 21.92 kg/m  as calculated from the following:    Height as of 23: 1.69 m (5' 6.54\").    Weight as of 23: 62.6 kg (138 lb).  Physical Exam  GENERAL: alert and no distress  EYES: Eyes grossly normal to inspection, PERRL and conjunctivae and sclerae normal  HENT: ear canals and TM's normal, nose and mouth without ulcers or lesions  NECK: no adenopathy, no asymmetry, masses, or scars  RESP: lungs clear to auscultation - no rales, rhonchi or wheezes  CV: regular rate and rhythm, normal S1 S2, no S3 or S4, no murmur, click or rub, no peripheral edema  ABDOMEN:  , soft, nontender, no hepatosplenomegaly, no masses and bowel sounds normal  MS: no gross musculoskeletal defects " noted, no edema  SKIN: no suspicious lesions or rashes  NEURO: Normal strength and tone, mentation intact and speech normal  PSYCH: mentation appears normal, affect normal/bright  LYMPH: no cervical, supraclavicular, axillary, or inguinal adenopathy      Signed Electronically by: Jacque Marina MD    Answers submitted by the patient for this visit:  Patient Health Questionnaire (Submitted on 1/31/2024)  If you checked off any problems, how difficult have these problems made it for you to do your work, take care of things at home, or get along with other people?: Not difficult at all  PHQ9 TOTAL SCORE: 2  MATHEW-7 (Submitted on 1/31/2024)  MATHEW 7 TOTAL SCORE: 0  Annual Preventive Visit (Submitted on 1/28/2024)  Chief Complaint: Annual Exam:  Blood in stool: No  heartburn: No  peripheral edema: No  mood changes: No  Skin sensation changes: No  tenderness: No  breast mass: No  breast discharge: No

## 2024-11-11 ENCOUNTER — PATIENT OUTREACH (OUTPATIENT)
Dept: CARE COORDINATION | Facility: CLINIC | Age: 44
End: 2024-11-11
Payer: COMMERCIAL

## 2024-12-20 ENCOUNTER — ANCILLARY PROCEDURE (OUTPATIENT)
Dept: MAMMOGRAPHY | Facility: HOSPITAL | Age: 44
End: 2024-12-20
Attending: FAMILY MEDICINE
Payer: COMMERCIAL

## 2024-12-20 DIAGNOSIS — Z12.31 VISIT FOR SCREENING MAMMOGRAM: ICD-10-CM

## 2024-12-20 PROCEDURE — 77067 SCR MAMMO BI INCL CAD: CPT

## 2024-12-20 PROCEDURE — 77063 BREAST TOMOSYNTHESIS BI: CPT

## 2025-01-27 SDOH — HEALTH STABILITY: PHYSICAL HEALTH: ON AVERAGE, HOW MANY DAYS PER WEEK DO YOU ENGAGE IN MODERATE TO STRENUOUS EXERCISE (LIKE A BRISK WALK)?: 1 DAY

## 2025-01-27 SDOH — HEALTH STABILITY: PHYSICAL HEALTH: ON AVERAGE, HOW MANY MINUTES DO YOU ENGAGE IN EXERCISE AT THIS LEVEL?: 20 MIN

## 2025-01-27 ASSESSMENT — ANXIETY QUESTIONNAIRES
1. FEELING NERVOUS, ANXIOUS, OR ON EDGE: NOT AT ALL
3. WORRYING TOO MUCH ABOUT DIFFERENT THINGS: NOT AT ALL
7. FEELING AFRAID AS IF SOMETHING AWFUL MIGHT HAPPEN: NOT AT ALL
IF YOU CHECKED OFF ANY PROBLEMS ON THIS QUESTIONNAIRE, HOW DIFFICULT HAVE THESE PROBLEMS MADE IT FOR YOU TO DO YOUR WORK, TAKE CARE OF THINGS AT HOME, OR GET ALONG WITH OTHER PEOPLE: NOT DIFFICULT AT ALL
6. BECOMING EASILY ANNOYED OR IRRITABLE: NOT AT ALL
GAD7 TOTAL SCORE: 0
7. FEELING AFRAID AS IF SOMETHING AWFUL MIGHT HAPPEN: NOT AT ALL
GAD7 TOTAL SCORE: 0
GAD7 TOTAL SCORE: 0
8. IF YOU CHECKED OFF ANY PROBLEMS, HOW DIFFICULT HAVE THESE MADE IT FOR YOU TO DO YOUR WORK, TAKE CARE OF THINGS AT HOME, OR GET ALONG WITH OTHER PEOPLE?: NOT DIFFICULT AT ALL
2. NOT BEING ABLE TO STOP OR CONTROL WORRYING: NOT AT ALL
4. TROUBLE RELAXING: NOT AT ALL
5. BEING SO RESTLESS THAT IT IS HARD TO SIT STILL: NOT AT ALL

## 2025-01-27 ASSESSMENT — SOCIAL DETERMINANTS OF HEALTH (SDOH): HOW OFTEN DO YOU GET TOGETHER WITH FRIENDS OR RELATIVES?: ONCE A WEEK

## 2025-01-27 ASSESSMENT — PATIENT HEALTH QUESTIONNAIRE - PHQ9
SUM OF ALL RESPONSES TO PHQ QUESTIONS 1-9: 1
10. IF YOU CHECKED OFF ANY PROBLEMS, HOW DIFFICULT HAVE THESE PROBLEMS MADE IT FOR YOU TO DO YOUR WORK, TAKE CARE OF THINGS AT HOME, OR GET ALONG WITH OTHER PEOPLE: NOT DIFFICULT AT ALL
SUM OF ALL RESPONSES TO PHQ QUESTIONS 1-9: 1

## 2025-01-28 ENCOUNTER — OFFICE VISIT (OUTPATIENT)
Dept: FAMILY MEDICINE | Facility: CLINIC | Age: 45
End: 2025-01-28
Payer: COMMERCIAL

## 2025-01-28 VITALS
TEMPERATURE: 98.2 F | WEIGHT: 145.7 LBS | DIASTOLIC BLOOD PRESSURE: 76 MMHG | HEART RATE: 71 BPM | SYSTOLIC BLOOD PRESSURE: 104 MMHG | RESPIRATION RATE: 16 BRPM | OXYGEN SATURATION: 99 % | HEIGHT: 67 IN | BODY MASS INDEX: 22.87 KG/M2

## 2025-01-28 DIAGNOSIS — Z12.4 CERVICAL CANCER SCREENING: ICD-10-CM

## 2025-01-28 DIAGNOSIS — Z00.00 ENCOUNTER FOR ROUTINE ADULT HEALTH EXAMINATION WITHOUT ABNORMAL FINDINGS: Primary | ICD-10-CM

## 2025-01-28 PROCEDURE — 99396 PREV VISIT EST AGE 40-64: CPT | Performed by: FAMILY MEDICINE

## 2025-01-28 PROCEDURE — G0145 SCR C/V CYTO,THINLAYER,RESCR: HCPCS | Performed by: FAMILY MEDICINE

## 2025-01-28 PROCEDURE — 87624 HPV HI-RISK TYP POOLED RSLT: CPT | Performed by: FAMILY MEDICINE

## 2025-01-28 ASSESSMENT — PAIN SCALES - GENERAL: PAINLEVEL_OUTOF10: NO PAIN (0)

## 2025-01-28 NOTE — PROGRESS NOTES
Preventive Care Visit  [facility]  Jacque Marina MD, Family Medicine  Jan 28, 2025  {Provider  Link to SmartSet :574389}    {PROVIDER CHARTING PREFERENCE:632172}    Kane Molina is a 44 year old, presenting for the following:  Physical (Pt is not fasting ) and Gyn Exam (Pap smear )        1/28/2025     9:48 AM   Additional Questions   Roomed by Jen LOPEZ   Accompanied by n/a          HPI  ***    {SUPERLIST (Optional):722905}  {additonal problems for provider to add (Optional):186492}  Health Care Directive  Patient does not have a Health Care Directive: {ADVANCE_DIRECTIVE_STATUS:699914}      1/27/2025   General Health   How would you rate your overall physical health? Good   Feel stress (tense, anxious, or unable to sleep) Not at all         1/27/2025   Nutrition   Three or more servings of calcium each day? Yes   Diet: Vegetarian/vegan   How many servings of fruit and vegetables per day? (!) 2-3   How many sweetened beverages each day? 0-1         1/27/2025   Exercise   Days per week of moderate/strenous exercise 1 day   Average minutes spent exercising at this level 20 min   (!) EXERCISE CONCERN      1/27/2025   Social Factors   Frequency of gathering with friends or relatives Once a week   Worry food won't last until get money to buy more No   Food not last or not have enough money for food? No   Do you have housing? (Housing is defined as stable permanent housing and does not include staying ouside in a car, in a tent, in an abandoned building, in an overnight shelter, or couch-surfing.) Yes   Are you worried about losing your housing? No   Lack of transportation? No   Unable to get utilities (heat,electricity)? No         1/27/2025   Dental   Dentist two times every year? Yes         1/27/2025   TB Screening   Were you born outside of the [place]? No       Today's PHQ-9 Score:       1/27/2025    11:03 AM   PHQ-9 SCORE   PHQ-9 Total Score MyChart 1 (Minimal depression)   PHQ-9 Total Score 1         Patient-reported         1/27/2025   Substance Use   Alcohol more than 3/day or more than 7/wk No   Do you use any other substances recreationally? No     Social History     Tobacco Use    Smoking status: Never    Smokeless tobacco: Never   Vaping Use    Vaping status: Never Used   Substance Use Topics    Alcohol use: Not Currently     Comment: 2 drinks daily    Drug use: Never     {Provider  If there are gaps in the social history shown above, please follow the link to update and then refresh the note Link to Social and Substance History :623607}      12/20/2024   LAST FHS-7 RESULTS   1st degree relative breast or ovarian cancer Yes   Any relative bilateral breast cancer No   Any male have breast cancer No   Any ONE woman have BOTH breast AND ovarian cancer No   Any woman with breast cancer before 50yrs No   2 or more relatives with breast AND/OR ovarian cancer No   2 or more relatives with breast AND/OR bowel cancer No     {If any of the questions to the FHS7 are answered yes, consider referral for genetic counseling.    Additional indications for genetic referral include personal history of breast or ovarian cancer, genetic mutation in 1st degree relative which increases risk of breast cancer including BRCA1, BRCA2, PATRICIA, PALB 2, TP53, CHEK2, PTEN, CDH1, STK11 (per ACS) and/or 1st degree relative with history of pancreatic or high-risk prostate cancer (per NCCN):354940}   {Mammogram Decision Support (Optional):069838}        1/27/2025   STI Screening   New sexual partner(s) since last STI/HIV test? No     History of abnormal Pap smear: { :307905}        Latest Ref Rng & Units 9/28/2021    11:43 AM 1/30/2018    10:33 AM 1/30/2018     9:20 AM   PAP / HPV   PAP  Negative for Intraepithelial Lesion or Malignancy (NILM)      PAP (Historical)    NIL    HPV 16 DNA Negative Negative  Negative     HPV 18 DNA Negative Negative  Negative     Other HR HPV Negative Negative  Negative       ASCVD Risk   The  "10-year ASCVD risk score (Filemon ZUNIGA, et al., 2019) is: 0.5%    Values used to calculate the score:      Age: 44 years      Sex: Female      Is Non- : No      Diabetic: No      Tobacco smoker: No      Systolic Blood Pressure: 104 mmHg      Is BP treated: No      HDL Cholesterol: 58 mg/dL      Total Cholesterol: 212 mg/dL        1/27/2025   Contraception/Family Planning   Questions about contraception or family planning No     {Provider  REQUIRED FOR AWV Use the storyboard to review patient history, after sections have been marked as reviewed, refresh note to capture documentation:577806}   Reviewed and updated as needed this visit by Provider                    {HISTORY OPTIONS (Optional):667885}    {ROS Picklists (Optional):585156}     Objective    Exam  /76 (BP Location: Left arm, Patient Position: Sitting, Cuff Size: Adult Regular)   Pulse 71   Temp 98.2  F (36.8  C) (Temporal)   Resp 16   Ht 1.7 m (5' 6.93\")   Wt 66.1 kg (145 lb 11.2 oz)   LMP  (LMP Unknown)   SpO2 99%   Breastfeeding Yes   BMI 22.87 kg/m     Estimated body mass index is 22.87 kg/m  as calculated from the following:    Height as of this encounter: 1.7 m (5' 6.93\").    Weight as of this encounter: 66.1 kg (145 lb 11.2 oz).    Physical Exam  {Exam Choices (Optional):999025}        Signed Electronically by: Jacque Marina MD  {Email feedback regarding this note to [email or web address]   :831139}  Answers submitted by the patient for this visit:  Patient Health Questionnaire (Submitted on 1/27/2025)  If you checked off any problems, how difficult have these problems made it for you to do your work, take care of things at home, or get along with other people?: Not difficult at all  PHQ9 TOTAL SCORE: 1  Patient Health Questionnaire (G7) (Submitted on 1/27/2025)  MATHEW 7 TOTAL SCORE: 0    " "Breast Cancer Maternal Grandmother     Neurologic Disorder Paternal Grandmother         als    Genetic Disorder Paternal Grandmother         ALS    Hypertension Paternal Grandfather     Cerebrovascular Disease Paternal Grandfather     Neurologic Disorder Sister         MS may be    Thyroid Disease Sister     Thyroid Disease Sister     Cerebrovascular Disease Cousin         Aged 40 at time of stroke         Current Outpatient Medications   Medication Sig Dispense Refill    acyclovir (ZOVIRAX) 400 MG tablet One tablet by mouth 3 times daily as needed for outbreak 30 tablet 0    triamcinolone (KENALOG) 0.1 % external cream Use twice a day as needed for up to two weeks 60 g 1     Allergies   Allergen Reactions    Nickel      Recent Labs   Lab Test 01/31/23  0852 01/27/17  0916   * 68   HDL 58 75   TRIG 72 50   ALT 14  --    CR 0.57  --    GFRESTIMATED >90  --    POTASSIUM 4.4  --    TSH 1.75 1.94          Review of Systems  Constitutional, HEENT, cardiovascular, pulmonary, GI, , musculoskeletal, neuro, skin, endocrine and psych systems are negative, except as otherwise noted.     Objective    Exam  /76 (BP Location: Left arm, Patient Position: Sitting, Cuff Size: Adult Regular)   Pulse 71   Temp 98.2  F (36.8  C) (Temporal)   Resp 16   Ht 1.7 m (5' 6.93\")   Wt 66.1 kg (145 lb 11.2 oz)   LMP  (LMP Unknown)   SpO2 99%   Breastfeeding Yes   BMI 22.87 kg/m     Estimated body mass index is 22.87 kg/m  as calculated from the following:    Height as of this encounter: 1.7 m (5' 6.93\").    Weight as of this encounter: 66.1 kg (145 lb 11.2 oz).    Physical Exam  GENERAL: alert and no distress  EYES: Eyes grossly normal to inspection, PERRL and conjunctivae and sclerae normal  HENT: ear canals and TM's normal, nose and mouth without ulcers or lesions  NECK: no adenopathy, no asymmetry, masses, or scars  RESP: lungs clear to auscultation - no rales, rhonchi or wheezes  BREAST: normal without masses, " tenderness or nipple discharge and no palpable axillary masses or adenopathy  CV: regular rate and rhythm, normal S1 S2, no S3 or S4, no murmur, click or rub, no peripheral edema  ABDOMEN: soft, nontender, no hepatosplenomegaly, no masses and bowel sounds normal   (female) w/bimanual: normal female external genitalia, normal urethral meatus, normal vaginal mucosa, and normal cervix/adnexa/uterus without masses or discharge  MS: no gross musculoskeletal defects noted, no edema  SKIN: no suspicious lesions or rashes  NEURO: Normal strength and tone, mentation intact and speech normal  PSYCH: mentation appears normal, affect normal/bright        Signed Electronically by: Jacque Marina MD    Answers submitted by the patient for this visit:  Patient Health Questionnaire (Submitted on 1/27/2025)  If you checked off any problems, how difficult have these problems made it for you to do your work, take care of things at home, or get along with other people?: Not difficult at all  PHQ9 TOTAL SCORE: 1  Patient Health Questionnaire (G7) (Submitted on 1/27/2025)  MATHEW 7 TOTAL SCORE: 0

## 2025-01-29 ENCOUNTER — PATIENT OUTREACH (OUTPATIENT)
Dept: CARE COORDINATION | Facility: CLINIC | Age: 45
End: 2025-01-29
Payer: COMMERCIAL

## 2025-01-30 LAB
HPV HR 12 DNA CVX QL NAA+PROBE: NEGATIVE
HPV16 DNA CVX QL NAA+PROBE: NEGATIVE
HPV18 DNA CVX QL NAA+PROBE: NEGATIVE
HUMAN PAPILLOMA VIRUS FINAL DIAGNOSIS: NORMAL

## 2025-01-31 LAB
BKR AP ASSOCIATED HPV REPORT: NORMAL
BKR LAB AP GYN ADEQUACY: NORMAL
BKR LAB AP GYN INTERPRETATION: NORMAL
BKR LAB AP PREVIOUS ABNORMAL: NORMAL
PATH REPORT.COMMENTS IMP SPEC: NORMAL
PATH REPORT.COMMENTS IMP SPEC: NORMAL
PATH REPORT.RELEVANT HX SPEC: NORMAL

## 2025-02-04 ENCOUNTER — MYC MEDICAL ADVICE (OUTPATIENT)
Dept: FAMILY MEDICINE | Facility: CLINIC | Age: 45
End: 2025-02-04
Payer: COMMERCIAL

## 2025-02-04 NOTE — TELEPHONE ENCOUNTER
Dr. Marina,    Please see below Cliq message and advise.   FYI - likely out of pocket cost for a patient after age 26 (OOP cost is $515 plus $30 admin fee)    Thanks,   Sarah CROWLEY RN

## 2025-02-12 ENCOUNTER — PATIENT OUTREACH (OUTPATIENT)
Dept: CARE COORDINATION | Facility: CLINIC | Age: 45
End: 2025-02-12
Payer: COMMERCIAL

## 2025-03-31 ENCOUNTER — MYC MEDICAL ADVICE (OUTPATIENT)
Dept: FAMILY MEDICINE | Facility: CLINIC | Age: 45
End: 2025-03-31
Payer: COMMERCIAL

## 2025-04-01 NOTE — TELEPHONE ENCOUNTER
LS,  Please see below [employer] message and advise.  Recent Annual 1/28/25  Mammogram 12/20/24  Thanks,  Marli CHANDRA RN

## 2025-04-15 ENCOUNTER — HOSPITAL ENCOUNTER (OUTPATIENT)
Dept: MRI IMAGING | Facility: HOSPITAL | Age: 45
Discharge: HOME | End: 2025-04-15
Attending: FAMILY MEDICINE | Admitting: FAMILY MEDICINE
Payer: COMMERCIAL

## 2025-04-15 DIAGNOSIS — R92.343 EXTREMELY DENSE TISSUE OF BOTH BREASTS ON MAMMOGRAPHY: ICD-10-CM

## 2025-04-15 DIAGNOSIS — Z80.3 FAMILY HISTORY OF MALIGNANT NEOPLASM OF BREAST: ICD-10-CM

## 2025-04-15 PROCEDURE — A9585 GADOBUTROL INJECTION: HCPCS | Performed by: FAMILY MEDICINE

## 2025-04-15 PROCEDURE — 255N000002 HC RX 255 OP 636: Performed by: FAMILY MEDICINE

## 2025-04-15 PROCEDURE — 77049 MRI BREAST C-+ W/CAD BI: CPT

## 2025-04-15 RX ORDER — GADOBUTROL 604.72 MG/ML
0.1 INJECTION INTRAVENOUS ONCE
Status: COMPLETED | OUTPATIENT
Start: 2025-04-15 | End: 2025-04-15

## 2025-04-15 RX ADMIN — GADOBUTROL 7 ML: 604.72 INJECTION INTRAVENOUS at 07:16

## 2025-05-05 ENCOUNTER — ANCILLARY PROCEDURE (OUTPATIENT)
Dept: MAMMOGRAPHY | Facility: CLINIC | Age: 45
End: 2025-05-05
Attending: FAMILY MEDICINE
Payer: COMMERCIAL

## 2025-05-05 DIAGNOSIS — N63.22 MASS OF UPPER INNER QUADRANT OF LEFT BREAST: ICD-10-CM

## 2025-05-05 PROCEDURE — 76642 ULTRASOUND BREAST LIMITED: CPT | Mod: LT
